# Patient Record
Sex: MALE | Race: WHITE | Employment: FULL TIME | URBAN - METROPOLITAN AREA
[De-identification: names, ages, dates, MRNs, and addresses within clinical notes are randomized per-mention and may not be internally consistent; named-entity substitution may affect disease eponyms.]

---

## 2017-05-25 ENCOUNTER — ALLSCRIPTS OFFICE VISIT (OUTPATIENT)
Dept: OTHER | Facility: OTHER | Age: 47
End: 2017-05-25

## 2017-05-25 DIAGNOSIS — E78.5 HYPERLIPIDEMIA: ICD-10-CM

## 2017-05-25 DIAGNOSIS — R73.01 IMPAIRED FASTING GLUCOSE: ICD-10-CM

## 2017-05-25 DIAGNOSIS — E87.6 HYPOKALEMIA: ICD-10-CM

## 2017-05-25 DIAGNOSIS — F40.10 SOCIAL PHOBIA: ICD-10-CM

## 2017-05-25 DIAGNOSIS — I10 ESSENTIAL (PRIMARY) HYPERTENSION: ICD-10-CM

## 2017-05-31 ENCOUNTER — ALLSCRIPTS OFFICE VISIT (OUTPATIENT)
Dept: OTHER | Facility: OTHER | Age: 47
End: 2017-05-31

## 2017-08-04 ENCOUNTER — ALLSCRIPTS OFFICE VISIT (OUTPATIENT)
Dept: OTHER | Facility: OTHER | Age: 47
End: 2017-08-04

## 2017-08-10 ENCOUNTER — GENERIC CONVERSION - ENCOUNTER (OUTPATIENT)
Dept: OTHER | Facility: OTHER | Age: 47
End: 2017-08-10

## 2017-08-31 ENCOUNTER — ALLSCRIPTS OFFICE VISIT (OUTPATIENT)
Dept: OTHER | Facility: OTHER | Age: 47
End: 2017-08-31

## 2017-09-05 ENCOUNTER — ALLSCRIPTS OFFICE VISIT (OUTPATIENT)
Dept: OTHER | Facility: OTHER | Age: 47
End: 2017-09-05

## 2017-10-04 ENCOUNTER — ALLSCRIPTS OFFICE VISIT (OUTPATIENT)
Dept: OTHER | Facility: OTHER | Age: 47
End: 2017-10-04

## 2017-10-20 ENCOUNTER — ALLSCRIPTS OFFICE VISIT (OUTPATIENT)
Dept: OTHER | Facility: OTHER | Age: 47
End: 2017-10-20

## 2017-10-30 ENCOUNTER — ALLSCRIPTS OFFICE VISIT (OUTPATIENT)
Dept: OTHER | Facility: OTHER | Age: 47
End: 2017-10-30

## 2017-11-14 ENCOUNTER — ALLSCRIPTS OFFICE VISIT (OUTPATIENT)
Dept: OTHER | Facility: OTHER | Age: 47
End: 2017-11-14

## 2017-11-28 ENCOUNTER — GENERIC CONVERSION - ENCOUNTER (OUTPATIENT)
Dept: OTHER | Facility: OTHER | Age: 47
End: 2017-11-28

## 2017-11-30 ENCOUNTER — ALLSCRIPTS OFFICE VISIT (OUTPATIENT)
Dept: OTHER | Facility: OTHER | Age: 47
End: 2017-11-30

## 2018-01-02 ENCOUNTER — GENERIC CONVERSION - ENCOUNTER (OUTPATIENT)
Dept: OTHER | Facility: OTHER | Age: 48
End: 2018-01-02

## 2018-01-02 ENCOUNTER — ALLSCRIPTS OFFICE VISIT (OUTPATIENT)
Dept: OTHER | Facility: OTHER | Age: 48
End: 2018-01-02

## 2018-01-10 NOTE — PSYCH
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  Children doing well  Feels 'time' will alleviate pain of Freda's addiction; love for her has left him Denied SI     A: Orlinda Collet returned to baseline mood and affect during session  He is comfortable with his plan of removing items and is pleased with his relationship with children  Discussed transitioning to biweekly sessions    P: Continue individual therapy  Further discussion regarding removal of items and possible residual feelings related to this  Pain Scale and Suicide Risk [facility]: Current Pain Assessment: no pain   Current suicide risk is low   Behavioral Health Treatment Plan [facility]: Diagnosis and Treatment Plan explained to patient, patient relates understanding diagnosis and is agreeable to Treatment Plan  Assessment    1  MDD (major depressive disorder), recurrent severe, without psychosis (296 33) (F33 2)   2   Social anxiety disorder (300 23) (F40 10)    Signatures   Electronically signed by : Alexander Pastor LCSW; Oct 20 2017  2:17PM EST                       (Author)

## 2018-01-10 NOTE — PSYCH
Provider Comments  Provider Comments:   Left a voice mail for Harjeet to inquire about his well being as he missed his appointment today at [address] [place] [address],[address] without a cancellation        Signatures   Electronically signed by : Zaira Israel LCSW; Aug 10 2017  5:20PM EST                       (Author)

## 2018-01-10 NOTE — RESULT NOTES
Verified Results  (1) URIC ACID [id number] 09:01AM Mckayla Lopez Order Number: [id number]     Test Name Result Flag Reference   URIC ACID 8 6 mg/dL H 4 2-8 0     (1) COMPREHENSIVE METABOLIC PANEL [id number] 80:42HX Mcakyla Lopez Order Number: [id number]      National Kidney Disease Education Program recommendations are as follows:  GFR calculation is accurate only with a steady state creatinine  Chronic Kidney disease less than 60 ml/min/1 73 sq  meters  Kidney failure less than 15 ml/min/1 73 sq  meters  Test Name Result Flag Reference   GLUCOSE,RANDM 126 mg/dL     If the patient is fasting, the ADA then defines impaired fasting glucose as > 100 mg/dL and diabetes as > or equal to 123 mg/dL     SODIUM 138 mmol/L  136-145   POTASSIUM 3 0 mmol/L L 3 5-5 3   CHLORIDE 96 mmol/L L 100-108   CARBON DIOXIDE 31 mmol/L  21-32   ANION GAP (CALC) 11 mmol/L  4-13   BLOOD UREA NITROGEN 16 mg/dL  5-25   CREATININE 0 91 mg/dL  0 60-1 30   Standardized to IDMS reference method   CALCIUM 9 5 mg/dL  8 3-10 1   BILI, TOTAL 0 38 mg/dL  0 20-1 00   ALK PHOSPHATAS 94 U/L     ALT (SGPT) 32 U/L  12-78   AST(SGOT) 17 U/L  5-45   ALBUMIN 4 0 g/dL  3 5-5 0   TOTAL PROTEIN 8 0 g/dL  6 4-8 2   eGFR Non-African American      >60 0 ml/min/1 73sq m     (1) CBC/PLT/DIFF [id number] 09:01AM Mckayla John Order Number: [id number]     Order Number: [id number]     Test Name Result Flag Reference   WBC COUNT 7 05 Thousand/uL  4 31-10 16   RBC COUNT 5 33 Million/uL  3 88-5 62   HEMOGLOBIN 16 9 g/dL  12 0-17 0   HEMATOCRIT 48 0 %  36 5-49 3   MCV 90 fL  82-98   MCH 31 7 pg  26 8-34 3   MCHC 35 2 g/dL  31 4-37 4   RDW 12 7 %  11 6-15 1   MPV 10 0 fL  8 9-12 7   PLATELET COUNT 529 Thousands/uL  149-390   NEUTROPHILS RELATIVE PERCENT 61 %  43-75   LYMPHOCYTES RELATIVE PERCENT 26 %  14-44   MONOCYTES RELATIVE PERCENT 9 %  4-12   EOSINOPHILS RELATIVE PERCENT 3 %  0-6   BASOPHILS RELATIVE PERCENT 1 %  0-1   NEUTROPHILS ABSOLUTE COUNT 4 33 Thousands/µL  1 85-7 62   LYMPHOCYTES ABSOLUTE COUNT 1 80 Thousands/µL  0 60-4 47   MONOCYTES ABSOLUTE COUNT 0 60 Thousand/µL  0 17-1 22   EOSINOPHILS ABSOLUTE COUNT 0 23 Thousand/µL  0 00-0 61   BASOPHILS ABSOLUTE COUNT 0 09 Thousands/µL  0 00-0 10     * XR FOOT 3+ VIEW RIGHT [id number] 08:58AM Magnolia Tijerina Order Number: [id number]     Test Name Result Flag Reference   XR FOOT 3+ VW RIGHT (Report)     RIGHT FOOT     INDICATION: Gout  Right foot pain, attention 1st toe  COMPARISON: None     VIEWS: AP, lateral and oblique ; 3 images     FINDINGS:     There is no acute fracture or dislocation  No degenerative changes  No lytic or blastic lesions are seen  No periarticular erosions  Soft tissue swelling noted medial to the head of the 1st metatarsal but no discrete tophus formation detected  IMPRESSION:     No acute osseous abnormality       Nonspecific soft tissue swelling medial to the head of the 1st metatarsal        Workstation performed: [id number]     Signed by:   Dariusz Duenas MD   2/18/16       Signatures   Electronically signed by : YE Montenegro; Feb 19 2016  6:10AM EST                       (Author)

## 2018-01-10 NOTE — PSYCH
Progress Note  Psychotherapy Provided [facility]: Individual Psychotherapy 50 minutes provided today  Goals addressed in session:   Discussed goals 1-2 of initial plan    D: Met with Katherin Ellis individually  ROS; remains 'so so'  Session focused upon specific obstacles ; rumination of thoughts regarding x-girlfriend  Items of hers remained stored in the basement triggering memories  "Why" statements explored and the fact they may never be answered  Communication with children going very well  Denied SI     A: Katherin Ellis presented appropriate mood and constricted affect during session  He demonstrates insight into his current stressors and demonstrates progress as he is challenging his social anxiety bu having dinner with an old friend tonight  P: Continue individual therapy  Further discussion regarding above obstacles  Pain Scale and Suicide Risk [facility]: Current Pain Assessment: no pain   Current suicide risk is low   Behavioral Health Treatment Plan [facility]: Diagnosis and Treatment Plan explained to patient, patient relates understanding diagnosis and is agreeable to Treatment Plan  Results/Data  PHQ-9 Adult Depression Screening [id number] 11:47AM Atul May     Test Name Result Flag Reference   PHQ-9 Adult Depression Score 5     Over the last two weeks, how often have you been bothered by any of the following problems?   Little interest or pleasure in doing things: Several days - 1  Feeling down, depressed, or hopeless: Several days - 1  Trouble falling or staying asleep, or sleeping too much: Not at all - 0  Feeling tired or having little energy: Several days - 1  Poor appetite or over eating: Not at all - 0  Feeling bad about yourself - or that you are a failure or have let yourself or your family down: Several days - 1  Trouble concentrating on things, such as reading the newspaper or watching television: Several days - 1  Moving or speaking so slowly that other people could have noticed  Or the opposite -  being so fidgety or restless that you have been moving around a lot more than usual: Not at all - 0  Thoughts that you would be better off dead, or of hurting yourself in some way: Not at all - 0   PHQ-9 Adult Depression Screening Negative     PHQ-9 Difficulty Level Somewhat difficult     PHQ-9 Severity Mild Depression         Assessment    1  MDD (major depressive disorder), recurrent severe, without psychosis (296 33) (F33 2)   2   Social anxiety disorder (300 23) (F40 10)    Signatures   Electronically signed by : Xiomara Hall LCSW; Oct  5 2017  4:37PM EST                       (Author)

## 2018-01-11 NOTE — PSYCH
History of Present Illness    Pre-morbid level of function and History of Present Illness:  Relationship issue  I would like a couples therapy  My girlfriend Doe Ruby is a recovering addict  Dating for less than a year but knew each other for 15  Clean 7 weeks  My stress level is high due to this   9 years  I clench my jaw, decreased motivation, not all the time but it's there  Dx social anxiety disorder and has overcome this  Reason for evaluation and partial hospitalization as an alternative to inpatient hospitalization: N/A  Current Psychiatrist/Therapist: Dr Kathy Rodriguez  Family Constellation (include parents, relationship with each and pertinent Psych/Medical History): Mother: passed away 9 years ago from lupus   Father: good relationship   Children: daughter,23 twin boys, 12 in Sept   He lives with with Doe Ruby and boys periodically  He does not live alone  Domestic Violence: No past history of domestic violence  The patient is not currently experiencing domestic violence  There is not suspected domestic violence  There is no history of child abuse  There is no history of sexual abuse  Additional Comments related to family/relationships/peer support:   Shared custody of boys  They come and go  Relationship good, my daughter and I are not due to girlfriend  Mother passed while living in the home with her  School or Work History (strengths/limitations/needs: 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  His highest grade level achieved was  two years of college  LEISURE ASSESSMENT (Include past and present hobbies/interests and level of involvement (Ex: Group/Club Affiliations): Fishing, hunting, riding 4 [place]  His primary language is  [place]  Ethnic considerations are   Religions affiliations and level of involvement [employer]   Spirituality and ariana have helped him cope with difficult situations in his life  The patient learns best by  reading and demonstration  SUBSTANCE ABUSE ASSESSMENT: past substance abuse, but no current substance abuse  Substance/Route/Age/Amount/Frequency/Last Use: Smoked Marijuana HS, college  Drink beers socially  No previous detox/rehab treatment  HEALTH ASSESSMENT: He has lost 10 lbs or more in the last 6 months without trying  He has not had decreased appetite for 5 days or more  nausea  no referral to PCP needed  no referral to nutritionist needed  not referred to PCP  PCP not notified  LEGAL: No Mental Health Advance Directive or Power of  on file  He does not want an information packet about Mental Health Advance Directives  The following ratings are based on my observation of this patient over the last 1 session  Risk of Harm to Self:   Demographic risk factors include , alaskan, or native [place]  Historical Risk Factors include: students have  of suicide  Recent Specific Risk Factors include: sense of hopelessness/helplessness, feelings of guilt or self blame and diagnosis of depression  Risk of Harm to Others:   Demographic Risk Factors include: male  Access to Weapons: The patient has access to the following weapons: [address]   the following steps have been taken to ensure weapons are properly secured: Locked cabinet - keys at another location  Based on the above information, the client presents the following risk of harm to self or others: low  Notes regarding this Risk Assessment: Denied current or historic SI/HI/SIB  Review of Systems  anxiety, depression, interpersonal relationship problems, emotional problems/concerns and decreased functioning ability, but no sleep disturbances  Constitutional: No fever or chills, feels well, no tiredness, no recent weight gain or weight loss  Eyes: No complaints of eye pain, no red eyes, no discharge from eyes, no itchy eyes     ENT: no complaints of earache, no hearing loss, no nosebleeds, no nasal discharge, no sore throat, no hoarseness  Cardiovascular: No complaints of slow heart rate, no fast heart rate, no chest pain, no palpitations, no leg claudication, no lower extremity  Respiratory: No complaints of shortness of breath, no wheezing, no cough, no SOB on exertion, no orthopnea or PND  Gastrointestinal: No complaints of abdominal pain, no constipation, no nausea or vomiting, no diarrhea or bloody stools  Genitourinary: No complaints of dysuria, no incontinence, no hesitancy, no nocturia, no genital lesion, no testicular pain  Integumentary: No complaints of skin rash or skin lesions, no itching, no skin wound, no dry skin  Neurological: No compliants of headache, no confusion, no convulsions, no numbness or tingling, no dizziness or fainting, no limb weakness, no difficulty walking  Endocrine: No complaints of proptosis, no hot flashes, no muscle weakness, no erectile dysfunction, no deepening of the voice, no feelings of weakness  Hematologic/Lymphatic: No complaints of swollen glands, no swollen glands in the neck, does not bleed easily, no easy bruising  ROS reviewed  Active Problems    1  Benign essential hypertension (401 1) (I10)   2  Hyperlipidemia (272 4) (E78 5)   3  Hypokalemia (276 8) (E87 6)   4  IFG (impaired fasting glucose) (790 21) (R73 01)   5  Long term use of drug (V58 69) (Z79 899)   6  MDD (major depressive disorder), recurrent severe, without psychosis (296 33) (F33 2)   7  Social anxiety disorder (300 23) (F40 10)    Past Medical History    1  History of Blood pressure elevated (401 9) (I10)   2  History of gout (V12 29) (Z87 39)    The active problems and past medical history were reviewed and updated today  Past Psychiatric History    Past Psychiatric History: Brandie Robins - 2007 -2008  OP: Beginning of college  Surgical History    The surgical history was reviewed and updated today  Family Psych History  Father    1   Family history of Aneurysm Of Abdominal Aorta   2  Family history of Hypertension (V17 49)  Brother    3  Family history of Hypertension (V17 49)  Paternal Grandmother    3  Family history of Hypertension (V17 49)  Paternal Grandfather    11  Family history of Hypertension (V17 49)   6  Family history of Sudden / Instantaneous Death  Paternal Uncle    9  Family history of Aneurysm Of Abdominal Aorta   8  Family history of Aneurysm Of Abdominal Aorta    The family history was reviewed and updated today  Substance Abuse Hx    Substance Abuse History: Smoked Marijuana HS, college  Drink beers socially  Social History    · Consumes alcohol occasionally (V49 89) (Z78 9)   · Current every day smoker (305 1) (F17 200)   · Denied: History of Drug Use   · Three children  The social history was reviewed and updated today  The social history was reviewed and is unchanged  Current Meds   1  ALPRAZolam 0 5 MG Oral Tablet (Xanax); TAKE 1 TABLET Every 8 hours PRN anxiety; Therapy: 19Apr2013 to (Evaluate:59Oty9598); Last Rx:37Rwm2179 Ordered   2  Valsartan 80 MG Oral Tablet; Take 1 tablet daily; Therapy: 55BQV8446 to (Royer Andujar)  Requested for: 11OGJ7157; Last   Rx:11Ivy7459 Ordered   3  Venlafaxine HCl ER 75 MG Oral Capsule Extended Release 24 Hour (Effexor XR); TAKE 1   CAPSULE Daily (Pt  states on Effexor); Therapy: 64ABH5369 to (Evaluate:29Lor0790)  Requested for: 51OFD5725; Last   Rx:79Wzz7271 Ordered   4  Vitamin C 500 MG Oral Tablet; TAKE 1 TABLET DAILY; Therapy: (Recorded:43Hom7537) to Recorded   5  Vitamin D 400 UNIT TABS; TAKE 1 TABLET DAILY; Therapy: (Recorded:17Nov2014) to Recorded    Allergies    1  chlorthalidone    2  No Known Environmental Allergies   3  No Known Food Allergies    Physical Exam    Appearance: was calm and cooperative and good eye contact  Observed mood: depressed, but mood appropriate  Observed mood: affect was blunted  Speech: a normal rate  Thought processes: coherent/organized  Hallucinations: no hallucinations present  Thought Content: no delusions  Abnormal Thoughts: The patient has no suicidal thoughts  Orientation: The patient is oriented to person, place and time  Recent and Remote Memory: short term memory intact and long term memory intact  Attention Span And Concentration: concentration intact  Insight: Insight intact  Judgment: His judgment was intact  Muscle Strength And Tone  Muscle strength and tone were normal    Language: no difficulty naming common objects   WNL  Fund of knowledge: Patient displays  WNL  The patient is experiencing no localized pain  On a scale of 0 - 10 the pain severity is a 0  DSM    Provisional Diagnosis: MDD recurrent; severe  Future Appointments    Date/Time Provider Specialty Site   08/31/2017 03:40 PM ANDRES Germain   Psychiat[facility]   11/28/2017 04:30 PM Maryalice Goltz, [address] [place] [address] Internal Medicine [facility]     Signatures   Electronically signed by : Scottie Castillo LCSW; Aug  4 2017  9:37AM EST                       (Author)    Electronically signed by : ANDRES Ceja ; Aug  4 2017 11:51AM EST                       (Author)

## 2018-01-11 NOTE — PSYCH
Psych Med Mgmt    Appearance: was calm and cooperative, adequate hygiene and grooming and good eye contact  Observed mood: mood appropriate  Observed mood: affect appropriate  Speech: a normal rate and fluent  Thought processes: coherent/organized  Hallucinations: no hallucinations present  Thought Content: no delusions  Abnormal Thoughts: The patient has no suicidal thoughts and no homicidal thoughts  Orientation: The patient is oriented to person, place and time, oriented to person, oriented to place and oriented to time  Recent and Remote Memory: short term memory intact and long term memory intact  Attention Span And Concentration: concentration impaired  Insight: Limited insight  Judgment: His judgment was limited  Muscle Strength And Tone  Muscle strength and tone were normal    The patient is experiencing no localized pain  Goals addressed in session: Medication Management       Treatment Recommendations: Continue current medications  Risks, Benefits And Possible Side Effects Of Medications: Risks, benefits, and possible side effects of medications explained to patient and patient verbalizes understanding  He reports normal appetite, normal energy level, no weight change and normal number of sleep hours  Mood has been depressed, he stated that after 3 years in a relationship he decided to break up with her because of her ongoing alcoholism  He stated he has good support system and stated he has been feeling well  He will like to start psychotherapy  Assessment    1  Social anxiety disorder (300 23) (F40 10)   2  MDD (major depressive disorder), recurrent severe, without psychosis (296 33) (F33 2)    Plan    1  ALPRAZolam 0 5 MG Oral Tablet (Xanax); TAKE 1 TABLET Every 8 hours PRN   anxiety; Do Not Fill Before: 08WHO5188   2   Venlafaxine HCl ER 75 MG Oral Capsule Extended Release 24 Hour (Effexor   XR); TAKE 1 CAPSULE Daily (Pt  states on Effexor)    Review of Systems    Constitutional: as noted in HPI  Substance Abuse Hx    Substance Abuse History: Denies  Active Problems    1  Benign essential hypertension (401 1) (I10)   2  Hyperlipidemia (272 4) (E78 5)   3  Hypokalemia (276 8) (E87 6)   4  IFG (impaired fasting glucose) (790 21) (R73 01)   5  Long term use of drug (V58 69) (Z79 899)   6  Social anxiety disorder (300 23) (F40 10)    Past Medical History    1  History of Blood pressure elevated (401 9) (I10)   2  History of gout (V12 29) (Z87 39)    The active problems and past medical history were reviewed and updated today  Allergies    1  chlorthalidone    2  No Known Environmental Allergies   3  No Known Food Allergies    Current Meds   1  ALPRAZolam 0 5 MG Oral Tablet; TAKE 1 TABLET Every 8 hours PRN anxiety; Therapy: 80Txl8875 to (Evaluate:16Jun2017); Last Rx:53Xor7390 Ordered   2  Valsartan 80 MG Oral Tablet; Take 1 tablet daily; Therapy: 25BXM3271 to (Anabel Jovani)  Requested for: 03CFI7295; Last   Rx:26Cwp4668 Ordered   3  Venlafaxine HCl ER 75 MG Oral Capsule Extended Release 24 Hour; TAKE 1 CAPSULE   Daily (Pt  states on Effexor); Therapy: 55FJT7480 to (Evaluate:85Gyg3277)  Requested for: 65PTS9735; Last   Rx:51Xab8093 Ordered   4  Vitamin C 500 MG Oral Tablet; TAKE 1 TABLET DAILY; Therapy: (Recorded:93Puc6676) to Recorded   5  Vitamin D 400 UNIT TABS; TAKE 1 TABLET DAILY; Therapy: (Recorded:17Nov2014) to Recorded    The medication list was reviewed and updated today  Family Psych History  Father    1  Family history of Aneurysm Of Abdominal Aorta   2  Family history of Hypertension (V17 49)  Brother    3  Family history of Hypertension (V17 49)  Paternal Grandmother    3  Family history of Hypertension (V17 49)  Paternal Grandfather    11  Family history of Hypertension (V17 49)   6  Family history of Sudden / Instantaneous Death  Paternal Uncle    9  Family history of Aneurysm Of Abdominal Aorta   8   Family history of Aneurysm Of Abdominal Aorta    The family history was reviewed and updated today  Social History    · Consumes alcohol occasionally (V49 89) (Z78 9)   · Current every day smoker (305 1) (F17 200)   · Denied: History of Drug Use   · Three children  The social history was reviewed and updated today  See HPI      End of Encounter Meds    1  Valsartan 80 MG Oral Tablet; Take 1 tablet daily; Therapy: 11IIS5374 to (Cyn Sharp)  Requested for: 66LJR7016; Last   Rx:38Gwf7583 Ordered    2  Vitamin C 500 MG Oral Tablet; TAKE 1 TABLET DAILY; Therapy: (Recorded:17Nov2014) to Recorded   3  Vitamin D 400 UNIT TABS; TAKE 1 TABLET DAILY; Therapy: (Recorded:17Nov2014) to Recorded    4  ALPRAZolam 0 5 MG Oral Tablet (Xanax); TAKE 1 TABLET Every 8 hours PRN anxiety; Therapy: 68Rok6788 to (Evaluate:67Kca1671); Last Rx:38Czn7461 Ordered   5  Venlafaxine HCl ER 75 MG Oral Capsule Extended Release 24 Hour (Effexor XR); TAKE 1   CAPSULE Daily (Pt  states on Effexor);    Therapy: 04YQD3716 to (Evaluate:90Aju4277)  Requested for: 46XFU7718; Last   FX:04YGU7522 Ordered    Future Appointments    Date/Time Provider Specialty Site   11/28/2017 04:30 PM José Morgan, [address] [facility]     Signatures   Electronically signed by : ANDRES Joe ; May 31 2017  4:58PM EST                       (Author)

## 2018-01-12 NOTE — PSYCH
Psych Med Mgmt    Appearance: was calm and cooperative, adequate hygiene and grooming and good eye contact  Observed mood: mood appropriate  Observed mood: affect appropriate  Speech: a normal rate  Thought processes: coherent/organized  Hallucinations: no hallucinations present  Thought Content: no delusions  Abnormal Thoughts: The patient has no suicidal thoughts and no homicidal thoughts  Orientation: The patient is oriented to person, place and time, oriented to person, oriented to place and oriented to time  Recent and Remote Memory: short term memory intact and long term memory intact  Attention Span And Concentration: concentration intact  Insight: Limited insight  Judgment: His judgment was limited  Muscle Strength And Tone  Muscle strength and tone were normal    The patient is experiencing no localized pain  Goals addressed in session: Medication Management       Treatment Recommendations: Continue current treatment  Risks, Benefits And Possible Side Effects Of Medications: Risks, benefits, and possible side effects of medications explained to patient and patient verbalizes understanding  The patient has been filling controlled prescriptions on time as prescribed to [facility] 26 program       He reports normal appetite, normal energy level, no weight change and normal number of sleep hours  Patient reported feeling "much better" He stated he no longer needs to use Alprazolam often to manage anxiety and he has worked with counselor to start decreasing the frequency of the visits since his mood has been stable since  He denies having any medication side effects   No recent health changes or new medications  Assessment    1  MDD (major depressive disorder), recurrent severe, without psychosis (296 33) (F33 2)    Plan    1   ALPRAZolam 0 5 MG Oral Tablet (Xanax); TAKE 1 TABLET Every 6 hours PRN;   Do Not Fill Before: 51TXF2218   2  Venlafaxine HCl ER 75 MG Oral Capsule Extended Release 24 Hour (Effexor   XR); take 1 capsule by mouth daily    Review of Systems    Constitutional: No fever, no chills, feels well, no tiredness, no recent weight gain or loss  Cardiovascular: no complaints of slow or fast heart rate, no chest pain, no palpitations  Respiratory: no complaints of shortness of breath, no wheezing, no dyspnea on exertion  Gastrointestinal: no complaints of abdominal pain, no constipation, no nausea, no diarrhea, no vomiting  Genitourinary: no complaints of dysuria, no incontinence, no pelvic pain, no urinary frequency  Musculoskeletal: no complaints of arthralgia, no myalgias, no limb pain, no joint stiffness  Integumentary: no complaints of skin rash, no itching, no dry skin  Neurological: no complaints of headache, no confusion, no numbness, no dizziness  Substance Abuse Hx    Substance Abuse History: Denies  Active Problems    1  Benign essential hypertension (401 1) (I10)   2  Hyperlipidemia (272 4) (E78 5)   3  Hypokalemia (276 8) (E87 6)   4  IFG (impaired fasting glucose) (790 21) (R73 01)   5  Long term use of drug (V58 69) (Z79 899)   6  MDD (major depressive disorder), recurrent severe, without psychosis (296 33) (F33 2)    Past Medical History    1  History of Blood pressure elevated (401 9) (I10)   2  History of gout (V12 29) (Z87 39)   3  History of Social anxiety disorder (300 23) (F40 10)    The active problems and past medical history were reviewed and updated today  Allergies    1  chlorthalidone    2  No Known Environmental Allergies   3  No Known Food Allergies    Current Meds   1  ALPRAZolam 0 5 MG Oral Tablet; TAKE 1 TABLET Every 6 hours PRN; Therapy: 19Apr2013 to (Evaluate:23Hyn2846); Last Rx:97Nzt7992 Ordered   2  Valsartan 80 MG Oral Tablet; Take 1 tablet daily;    Therapy: 13ZMT0705 to (Frankie Morelos)  Requested for: 34GXQ2515; Last   Rx:93Duo7258 Ordered 3  Venlafaxine HCl ER 75 MG Oral Capsule Extended Release 24 Hour; take 1 capsule by   mouth daily; Therapy: 53MTG5372 to (Evaluate:00Uob8969)  Requested for: 21Nov2017; Last   Rx:[id number] Ordered   4  Vitamin C 500 MG Oral Tablet; TAKE 1 TABLET DAILY; Therapy: (Recorded:17Nov2014) to Recorded   5  Vitamin D 400 UNIT TABS; TAKE 1 TABLET DAILY; Therapy: (Recorded:17Nov2014) to Recorded    The medication list was reviewed and updated today  Family Psych History  Father    1  Family history of Aneurysm Of Abdominal Aorta   2  Family history of Hypertension (V17 49)  Brother    3  Family history of Hypertension (V17 49)  Paternal Grandmother    [place]  Family history of Hypertension (V17 49)  Paternal Grandfather    11  Family history of Hypertension (V17 49)   6  Family history of Sudden / Instantaneous Death  Paternal Uncle    9  Family history of Aneurysm Of Abdominal Aorta   8  Family history of Aneurysm Of Abdominal Aorta    The family history was reviewed and updated today  Social History    · Consumes alcohol occasionally (V49 89) (Z78 9)   · Current every day smoker (305 1) (F17 200)   · Denied: History of Drug Use   · Three children  The social history was reviewed and updated today  The social history was reviewed and is unchanged  End of Encounter Meds    1  Valsartan 80 MG Oral Tablet; Take 1 tablet daily; Therapy: 17OGW4512 to (Linn Camp)  Requested for: 40XOM3473; Last   Rx:49Jfc5383 Ordered    2  Vitamin C 500 MG Oral Tablet; TAKE 1 TABLET DAILY; Therapy: (Recorded:17Nov2014) to Recorded   3  Vitamin D 400 UNIT TABS; TAKE 1 TABLET DAILY; Therapy: (Recorded:17Nov2014) to Recorded    4  ALPRAZolam 0 5 MG Oral Tablet (Xanax); TAKE 1 TABLET Every 6 hours PRN; Therapy: 19Apr2013 to (Evaluate:27Hlu1713); Last Rx:84Hyp9486 Ordered   5  Venlafaxine HCl ER 75 MG Oral Capsule Extended Release 24 Hour (Effexor XR); take 1   capsule by mouth daily;    Therapy: 33OIA6146 to (Evaluate:91Cnb7228)  Requested for: 59DIF7371; Last   Rx:64Hkv0503 Ordered    Future Appointments    Date/Time Provider Specialty Site   01[facility]02[facility]2018 [facility] [place] [place] [facility]     Signatures   Electronically signed by : ANDRES Turcios ; Nov 30 2017 11:50AM EST                       (Author)

## 2018-01-13 NOTE — PSYCH
Psych Med Mgmt    Appearance: was calm and cooperative, adequate hygiene and grooming and good eye contact  Observed mood: mood appropriate  Observed mood: affect appropriate  Speech: a normal rate and fluent  Thought processes: coherent/organized  Hallucinations: no hallucinations present  Thought Content: no delusions  Abnormal Thoughts: The patient has no suicidal thoughts and no homicidal thoughts  Orientation: The patient is oriented to person, place and time, oriented to person, oriented to place and oriented to time  Recent and Remote Memory: short term memory intact and long term memory intact  Attention Span And Concentration: concentration intact  Insight: Insight intact  Judgment: His judgment was intact  Muscle Strength And Tone  Muscle strength and tone were normal    The patient is experiencing no localized pain  Goals addressed in session: Medication Management       Treatment Recommendations: Continue current medications  Risks, Benefits And Possible Side Effects Of Medications: Risks, benefits, and possible side effects of medications explained to patient and patient verbalizes understanding  He reports normal appetite, normal energy level, no weight change and normal number of sleep hours  Mood is stable  No complaints or concerns  No medication side effects  Vitals  Signs   Recorded: 57DFD2339 11:00AM   Height: 5 ft 10 in  Weight: 180 lb   BMI Calculated: 25 83  BSA Calculated: 2    Assessment    1  Severe recurrent major depression (296 33) (F33 2)   2  Anxiety (300 00) (F41 9)    Plan    1  ALPRAZolam 0 5 MG Oral Tablet (Xanax); TAKE 0 5 TABLET 3 times daily PRN;   Do Not Fill Before: 51VYU8706    2  Venlafaxine HCl ER 75 MG Oral Capsule Extended Release 24 Hour (Effexor   XR); TAKE 1 CAPSULE Daily (Pt  states on Effexor)    Review of Systems    Constitutional: as noted in HPI        Substance Abuse Hx    Substance Abuse History: Tobacco  Active Problems    1  Anxiety (300 00) (F41 9)   2  Benign essential hypertension (401 1) (I10)   3  Gout (274 9) (M10 9)   4  Hyperlipidemia (272 4) (E78 5)   5  Hypokalemia (276 8) (E87 6)   6  IFG (impaired fasting glucose) (790 21) (R73 01)   7  Long term use of drug (V58 69) (Z79 899)   8  Need for diphtheria-tetanus-pertussis (Tdap) vaccine (V06 1) (Z23)   9  Need for prophylactic vaccination and inoculation against influenza (V04 81) (Z23)   10  Obstructive sleep apnea (327 23) (G47 33)   11  Severe recurrent major depression (296 33) (F33 2)   12  Social phobia (300 23) (F40 10)    Past Medical History    1  History of Blood pressure elevated (401 9) (I10)    The active problems and past medical history were reviewed and updated today  Allergies    1  No Known Drug Allergies    2  No Known Environmental Allergies   3  No Known Food Allergies    Current Meds   1  ALPRAZolam 0 5 MG Oral Tablet; TAKE 0 5 TABLET 3 times daily PRN; Therapy: 33Glo6061 to (Evaluate:22Nov2016); Last Rx:47Ozv5087 Ordered   2  Indomethacin 50 MG Oral Capsule; TAKE 1 CAPSULE Every 8 hours PRN WITH FOOD   FOR PAIN;   Therapy: 53OPE2347 to (Evaluate:67Mdk2262)  Requested for: 52SME1377; Last   Rx:85Iyw4999 Ordered   3  Valsartan 80 MG Oral Tablet; Take 1 tablet daily; Therapy: 74XJT6088 to (Yash Salomon)  Requested for: 47Frn9286; Last   Rx:25Tsp7846 Ordered   4  Venlafaxine HCl ER 75 MG Oral Capsule Extended Release 24 Hour; TAKE 1 CAPSULE   Daily (Pt  states on Effexor); Therapy: 56HCY6001 to (Evaluate:17Nov2016)  Requested for: 00ABH7395; Last   Rx:52Oef4291 Ordered   5  Vitamin C 500 MG Oral Tablet; TAKE 1 TABLET DAILY; Therapy: (Recorded:17Nov2014) to Recorded   6  Vitamin D 400 UNIT TABS; TAKE 1 TABLET DAILY; Therapy: (Recorded:17Nov2014) to Recorded    The medication list was reviewed and updated today  Family Psych History  Father    1  Family history of Aneurysm Of Abdominal Aorta   2   Family history of Hypertension (V17 49)  Brother    3  Family history of Hypertension (V17 49)  Paternal Grandmother    3  Family history of Hypertension (V17 49)  Paternal Grandfather    11  Family history of Hypertension (V17 49)   6  Family history of Sudden / Instantaneous Death  Paternal Uncle    9  Family history of Aneurysm Of Abdominal Aorta   8  Family history of Aneurysm Of Abdominal Aorta    The family history was reviewed and updated today  Social History    · Consumes alcohol occasionally (V49 89) (Z78 9)   · Current every day smoker (305 1) (F17 200)   · Denied: History of Drug Use   · Three children  The social history was reviewed and updated today  The social history was reviewed and is unchanged  End of Encounter Meds    1  ALPRAZolam 0 5 MG Oral Tablet (Xanax); TAKE 0 5 TABLET 3 times daily PRN; Therapy: 60Ayu7626 to (Evaluate:79Pqd9284); Last Rx:06Oct2016 Ordered    2  Valsartan 80 MG Oral Tablet; Take 1 tablet daily; Therapy: 75PNE8386 to (Sonal Pastor)  Requested for: 00Syp5428; Last   Rx:69Nkn4378 Ordered    3  Vitamin C 500 MG Oral Tablet; TAKE 1 TABLET DAILY; Therapy: (Recorded:14Xrz8366) to Recorded   4  Vitamin D 400 UNIT TABS; TAKE 1 TABLET DAILY; Therapy: (Recorded:17Nov2014) to Recorded    5  Indomethacin 50 MG Oral Capsule; TAKE 1 CAPSULE Every 8 hours PRN WITH FOOD   FOR PAIN;   Therapy: 56KCL9354 to (Evaluate:40Qif2992)  Requested for: 01TLX1644; Last   Rx:88Vci8918 Ordered    6  Venlafaxine HCl ER 75 MG Oral Capsule Extended Release 24 Hour (Effexor XR); TAKE 1   CAPSULE Daily (Pt  states on Effexor);    Therapy: 97JID5708 to (([phone])  Requested for: 50QYP5909; Last   Rx:06Oct2016; Status: ACTIVE - Transmit to Pharmacy - Awaiting Verification Ordered    Signatures   Electronically signed by : ANDRES Castillo ; Oct  6 2016 11:02AM EST                       (Author)

## 2018-01-13 NOTE — PSYCH
Progress Note  Psychotherapy Provided [facility]: Individual Psychotherapy 50 minutes provided today  Goals addressed in session:   Discussed goals 1-2 of initial plan    D: Met with Eron Pineda individually  ROS; 'I am great ' Session focused upon removal of Freda's things from basement; this caused great relief for Eron Pineda and he remains motivated to move on  Work going very well, relationships with children remain positive and he has begun to see a lady whom he has known from years back  Denied SI     A: Eron Pineda presented as happy and motivated to move forward  He has begun to date again  Agreed to complete one final session in January to make sure all is going well before discharging from treatment  P: Termination session in January pending all remains well  Pain Scale and Suicide Risk [facility]: Current Pain Assessment: no pain   Current suicide risk is low   Behavioral Health Treatment Plan [facility]: Diagnosis and Treatment Plan explained to patient, patient relates understanding diagnosis and is agreeable to Treatment Plan  Assessment    1   MDD (major depressive disorder), recurrent severe, without psychosis (296 33) (F33 2)    Signatures   Electronically signed by : Raul Paulino LCSW; Nov 14 2017  1:44PM EST                       (Author)

## 2018-01-13 NOTE — MISCELLANEOUS
Provider Comments  Provider Comments: The patient did not show up for his appointment today  A message was left for him to reschedule  Signatures   Electronically signed by : Naman Perez MD; Aug 29 2016 11:20PM EST                          Electronically signed by :  Naman Perez MD; Aug 29 2016 11:20PM EST

## 2018-01-14 VITALS
WEIGHT: 172.04 LBS | TEMPERATURE: 98.1 F | BODY MASS INDEX: 24.63 KG/M2 | OXYGEN SATURATION: 96 % | HEIGHT: 70 IN | DIASTOLIC BLOOD PRESSURE: 72 MMHG | SYSTOLIC BLOOD PRESSURE: 124 MMHG | HEART RATE: 94 BPM | RESPIRATION RATE: 18 BRPM

## 2018-01-14 NOTE — PSYCH
Progress Note  Psychotherapy Provided [facility]: Individual Psychotherapy 50 minutes provided today  Goals addressed in session:   D: Met with Alanna Lan individually  ROS; remains "depressed and I can't focus on anything but my job'  Session focused upon specific obstacles ; break up with girlfriend, working on communication with children and judgements towards being unable to concentrate  Completion of initial treatment plan with these obstacles included  Denied SI     A: Alanna Lan presented with depressed mood and constricted affect during session  He demonstrates insight into his current stressors and demonstrates progress in that he is working on communication with children and has asked now x-girlfriend to leave his home  P: Continue individual therapy  Further discussion regarding above obstacles  Pain Scale and Suicide Risk [facility]: Current Pain Assessment: no pain   Current suicide risk is low   Behavioral Health Treatment Plan [facility]: Diagnosis and Treatment Plan explained to patient, patient relates understanding diagnosis and is agreeable to Treatment Plan  Results/Data  PHQ-9 Adult Depression Screening 11Izl3459 10:54AM Jake Rodriguez     Test Name Result Flag Reference   PHQ-9 Adult Depression Score 12     Over the last two weeks, how often have you been bothered by any of the following problems?   Little interest or pleasure in doing things: Several days - 1  Feeling down, depressed, or hopeless: More than half the days - 2  Trouble falling or staying asleep, or sleeping too much: Several days - 1  Feeling tired or having little energy: Several days - 1  Poor appetite or over eating: Several days - 1  Feeling bad about yourself - or that you are a failure or have let yourself or your family down: More than half the days - 2  Trouble concentrating on things, such as reading the newspaper or watching television: Nearly every day - 3  Moving or speaking so slowly that other people could have noticed  Or the opposite -  being so fidgety or restless that you have been moving around a lot more than usual: Several days - 1  Thoughts that you would be better off dead, or of hurting yourself in some way: Not at all - 0   PHQ-9 Adult Depression Screening Positive     PHQ-9 Difficulty Level Very difficult     PHQ-9 Severity Moderate Depression         Assessment    1   MDD (major depressive disorder), recurrent severe, without psychosis (296 33) (F33 2)    Signatures   Electronically signed by : Emily Delgadillo LCSW; Sep  5 2017 11:45AM EST                       (Author)

## 2018-01-14 NOTE — PSYCH
Psych Med Mgmt    Appearance: was calm and cooperative, adequate hygiene and grooming and good eye contact  Observed mood: depressed and anxious  Observed mood: affect was constricted  Speech: a normal rate and fluent  Thought processes: coherent/organized  Hallucinations: no hallucinations present  Thought Content: no delusions  Abnormal Thoughts: The patient has no suicidal thoughts and no homicidal thoughts  Orientation: The patient is oriented to person, place and time, oriented to person, oriented to place and oriented to time  Recent and Remote Memory: short term memory intact and long term memory intact  Attention Span And Concentration: concentration impaired  Insight: Limited insight  Judgment: His judgment was limited  Muscle Strength And Tone  Muscle strength and tone were normal    The patient is experiencing no localized pain  Goals addressed in session: Medication Management       Treatment Recommendations: Increase Alprazolam to 0 5 mg q6 h  Risks, Benefits And Possible Side Effects Of Medications: Risks, benefits, and possible side effects of medications explained to patient and patient verbalizes understanding  The patient has been filling controlled prescriptions on time as prescribed to [facility] 26 program       He reports normal appetite, normal energy level, no weight change and normal number of sleep hours  Mood is depressed and anxious  He stated he was able to meet with counselor but still feel the time between appointments is too long   Denies medication side effects  He requested increase in frequency for Alprazolam until he is able to see counselor frequent enough  Assessment    1  MDD (major depressive disorder), recurrent severe, without psychosis (296 33) (F33 2)    Plan    1   From  ALPRAZolam 0 5 MG Oral Tablet TAKE 1 TABLET Every 8 hours PRN   anxiety To ALPRAZolam 0 5 MG Oral Tablet (Xanax) TAKE 1 TABLET Every 6 hours PRN;   Do Not Fill Before: 10Sep2017   2  Venlafaxine HCl ER 75 MG Oral Capsule Extended Release 24 Hour (Effexor   XR); TAKE 1 CAPSULE Daily (Pt  states on Effexor)    Review of Systems    Constitutional: No fever, no chills, feels well, no tiredness, no recent weight gain or loss  Cardiovascular: no complaints of slow or fast heart rate, no chest pain, no palpitations  Respiratory: no complaints of shortness of breath, no wheezing, no dyspnea on exertion  Gastrointestinal: no complaints of abdominal pain, no constipation, no nausea, no diarrhea, no vomiting  Genitourinary: no complaints of dysuria, no incontinence, no pelvic pain, no urinary frequency  Musculoskeletal: no complaints of arthralgia, no myalgias, no limb pain, no joint stiffness  Integumentary: no complaints of skin rash, no itching, no dry skin  Neurological: no complaints of headache, no confusion, no numbness, no dizziness  Substance Abuse Hx    Substance Abuse History: Denies  Active Problems    1  Benign essential hypertension (401 1) (I10)   2  Hyperlipidemia (272 4) (E78 5)   3  Hypokalemia (276 8) (E87 6)   4  IFG (impaired fasting glucose) (790 21) (R73 01)   5  Long term use of drug (V58 69) (Z79 899)   6  MDD (major depressive disorder), recurrent severe, without psychosis (296 33) (F33 2)   7  Social anxiety disorder (300 23) (F40 10)    Past Medical History    1  History of Blood pressure elevated (401 9) (I10)   2  History of gout (V12 29) (Z87 39)    The active problems and past medical history were reviewed and updated today  Allergies    1  chlorthalidone    2  No Known Environmental Allergies   3  No Known Food Allergies    Current Meds   1  ALPRAZolam 0 5 MG Oral Tablet; TAKE 1 TABLET Every 8 hours PRN anxiety; Therapy: 19Apr2013 to (Evaluate:13Nov2017); Last Rx:53Mqy7566 Ordered   2  Valsartan 80 MG Oral Tablet; Take 1 tablet daily;    Therapy: 59MCG5002 to (Rashi Bathe)  Requested for: 75OWE0087; Last   Rx:33Wnn4737 Ordered   3  Venlafaxine HCl ER 75 MG Oral Capsule Extended Release 24 Hour; TAKE 1 CAPSULE   Daily (Pt  states on Effexor); Therapy: 16DHA5221 to (Evaluate:13Nov2017)  Requested for: 01Vyv5884; Last   Rx:63Uck1520 Ordered   4  Vitamin C 500 MG Oral Tablet; TAKE 1 TABLET DAILY; Therapy: (Recorded:17Nov2014) to Recorded   5  Vitamin D 400 UNIT TABS; TAKE 1 TABLET DAILY; Therapy: (Recorded:17Nov2014) to Recorded    The medication list was reviewed and updated today  Family Psych History  Father    1  Family history of Aneurysm Of Abdominal Aorta   2  Family history of Hypertension (V17 49)  Brother    3  Family history of Hypertension (V17 49)  Paternal Grandmother    3  Family history of Hypertension (V17 49)  Paternal Grandfather    11  Family history of Hypertension (V17 49)   6  Family history of Sudden / Instantaneous Death  Paternal Uncle    9  Family history of Aneurysm Of Abdominal Aorta   8  Family history of Aneurysm Of Abdominal Aorta    The family history was reviewed and updated today  Social History    · Consumes alcohol occasionally (V49 89) (Z78 9)   · Current every day smoker (305 1) (F17 200)   · Denied: History of Drug Use   · Three children  The social history was reviewed and updated today  The social history was reviewed and is unchanged  End of Encounter Meds    1  Valsartan 80 MG Oral Tablet; Take 1 tablet daily; Therapy: 37ARS7966 to (Rashi Bathe)  Requested for: 99FNY9340; Last   Rx:35Pls9634 Ordered    2  Vitamin C 500 MG Oral Tablet; TAKE 1 TABLET DAILY; Therapy: (Recorded:17Nov2014) to Recorded   3  Vitamin D 400 UNIT TABS; TAKE 1 TABLET DAILY; Therapy: (Recorded:17Nov2014) to Recorded    4  ALPRAZolam 0 5 MG Oral Tablet (Xanax); TAKE 1 TABLET Every 6 hours PRN; Therapy: 19Apr2013 to (Evaluate:06Mzd4569); Last Rx:73Jmo3991 Ordered   5   Venlafaxine HCl ER 75 MG Oral Capsule Extended Release 24 Hour (Effexor XR); TAKE 1   CAPSULE Daily (Pt  states on Effexor);    Therapy: 17TWS4247 to (Evaluate:50Fnq8228)  Requested for: 93Hel6530; Last   Rx:[id number] Ordered    Future Appointments    Date/Time Provider Specialty Site   09/05/2017 10:00 AM José Luis Hart LCSW Psychiatry [facility]   10/04/2017 11:00 AM José Luis aHrt [facility] Psychiatry [facility]   10/12/2017 11:00 AM José Luis Hart [facility] Psychiatry [facility]   10/20/2017 12:00 PM José Luis Hart [facility] Psychiatry [facility]   11/28/2017 04:30 PM Rick Gu[facility]     Signatures   Electronically signed by : ANDRES Villa ; Aug 31 2017  4:12PM EST                       (Author)

## 2018-01-16 NOTE — MISCELLANEOUS
Provider Comments  Provider Comments:   Pt was a NOS  LMOM to call back and make new appointment        Signatures   Electronically signed by : Luisa Stone, [address]; Dec  1 2017  9:02AM EST                       (Author)

## 2018-01-17 NOTE — PSYCH
Progress Note  Psychotherapy Provided [facility]: Individual Psychotherapy 50 minutes provided today  Goals addressed in session:   Discussed goals 1-2 of initial plan    D: Met with Janelle Haas individually  ROS; 'things are ok ' Session focused upon plan to remove Freda's items falling through due to helpers being unavailable last minute  Text message triggered by Lizz Monique due to not having items dripped off; this opened up feelings of hurt and loss again  Janelle Haas expressed he is wondering how it will feel once items are gone; relief, loss, anxiety etc  Further discussion regarding change (+ or -) being both scary and exciting  Denied SI     A: Janelle Haas presented as happy and motivated to move forward once Freda's items are removed from his home  Feelings triggered by her text message though able to see how her manipulative tendencies remain present  P: Continue individual therapy  Further discussion regarding removal of items and possible residual feelings related to this  Pain Scale and Suicide Risk [facility]: Current Pain Assessment: no pain   Current suicide risk is low   Behavioral Health Treatment Plan Cassi Voss: Diagnosis and Treatment Plan explained to patient, patient relates understanding diagnosis and is agreeable to Treatment Plan  Assessment    1  MDD (major depressive disorder), recurrent severe, without psychosis (296 33) (F33 2)   2   Social anxiety disorder (300 23) (F40 10)    Signatures   Electronically signed by : Hill Lebron LCSW; Oct 30 2017  4:43PM EST                       (Author)

## 2018-01-17 NOTE — PSYCH
Treatment Plan Tracking    #1 Treatment Plan not completed within required time limits due to: Client cancelled/ no-showed scheduled appointment            Signatures   Electronically signed by : María Elena Raya LCSW; Sep  5 2017 10:04AM EST                       (Author)

## 2018-01-18 NOTE — PSYCH
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, good teacher; good relationship with my students,      Diagnosis:   Axis I: MDD recurrent severe' Social anxiety Disorder     Area of Needs: Grief for previous relationship, self esteem, depression  Long Term Goals:   I communicate more   Target Date: 1/2/17      I have begun to focus   Target Date: 1/2/17       Target Date: 1/2/17    Short Term Objectives:   Goal 1:   1  I communicate more with my children; primarily [place]  2  I communicate and seek support from other family/friends  Target Date: 1/2/17      Goal 2:   1  I no longer ruminate about Freda  2  Lizz Monique is completely out of my house  Target Date: 1/2/17      Target Date: 1/2/17      GOAL 1: Modality: Individual 4 x per month Target Date: 1/2/17       The person(s) responsible for carrying out the plan is Janelle Durbin  GOAL 2: Modality: Individual 4 x per month Target Date: 1/2/17       The person(s) responsible for carrying out the plan is Janelle Durbin  Target Date:              The first scheduled review date is 1/2/17  The expected length of service is 120 Days  Level of functioning at initial assessment: 80  The highest level of functioning in the past year was Unknown  The current level of functioning is 80               CLIENT COMMENTS / Please share your thoughts, feelings, need and/or experiences regarding your treatment plan: _____________________________________________________________________________________________________________________________________________________________________________________________________________________________________________________________________________________________________________________ Date/Time: ______________     Patient Signature: _________________________________ Date/Time: ______________ Electronically signed by : Beata De La Rosa LCSW; Sep  5 2017 10:45AM EST                       (Author)

## 2018-01-23 NOTE — PSYCH
Progress Note  Psychotherapy Provided [facility]: Individual Psychotherapy 50 minutes provided today  Goals addressed in session:   Discussed goals 1-2 of initial plan    D: Met with Leela Hess individually  ROS; 'Everything is great' Session focused upon this being a termination session with reassessment of treatment plan goals  Both goals were completed  Denied SI     A: Leela Hess presented as happy and motivated to move forward  Relationship with children are very strong and work is going well  P: Leela Hess is discharged  He is encouraged to return to therapy if/when a need arises  He agreed  Pain Scale and Suicide Risk [facility]: Current Pain Assessment: no pain   Current suicide risk is low   Behavioral Health Treatment Plan [facility]: Diagnosis and Treatment Plan explained to patient, patient relates understanding diagnosis and is agreeable to Treatment Plan  Assessment    1   MDD (major depressive disorder), recurrent severe, without psychosis (296 33) (F33 2)    Signatures   Electronically signed by : Emma Joyner LCSW; Jan 2 2018 11:45AM EST                       (Author)    Electronically signed by : Emma Joyner LCSW; Jan 2 2018 12:03PM EST                       (Author)

## 2018-01-23 NOTE — PSYCH
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, good teacher; good relationship with my students,      Diagnosis:   Axis I: MDD recurrent severe' Social anxiety Disorder     Area of Needs: Grief for previous relationship, self esteem, depression  Long Term Goals:   I communicate more   Target Date: 1/2/18   Completion Date: 1/2/18     I have begun to focus   Target Date: 1/2/18   Completion Date: 1/2/18      Target Date: 1/2/18   Completion Date: 1/2/18   Short Term Objectives:   Goal 1:   1  I communicate more with my children; primarily [place]  2  I communicate and seek support from other family/friends  Target Date: 1/2/18   Completion Date: 1/2/18     Goal 2:   1  I no longer ruminate about Freda  2  Emigdio Wagoner is completely out of my house  Target Date: 1/2/18   Completion Date: 1/2/18     Target Date: 1/2/18   Completion Date: 1/2/18     GOAL 1: Modality: Individual 4 x per month Target Date: 1/2/18 Completion Date: 1/2/18       The person(s) responsible for carrying out the plan is Heron Barillas  GOAL 2: Modality: Individual 4 x per month Target Date: 1/2/18 Completion Date: 1/2/18       The person(s) responsible for carrying out the plan is Heron Barillas  The first scheduled review date is 1/2/17  The expected length of service is 120 Days  Level of functioning at initial assessment: 80  The highest level of functioning in the past year was 95  The current level of functioning is 95               CLIENT COMMENTS / Please share your thoughts, feelings, need and/or experiences regarding your treatment plan: _____________________________________________________________________________________________________________________________________________________________________________________________________________________________________________________________________________________________________________________ Date/Time: ______________     Patient Signature: _________________________________ Date/Time: ______________       Electronically signed by : Merle Almendarez, LCSW; Jan 2 2018 10:49AM EST                       (Author)

## 2018-03-06 ENCOUNTER — TELEPHONE (OUTPATIENT)
Dept: BEHAVIORAL/MENTAL HEALTH CLINIC | Facility: CLINIC | Age: 48
End: 2018-03-06

## 2018-03-06 DIAGNOSIS — F41.1 GAD (GENERALIZED ANXIETY DISORDER): Primary | ICD-10-CM

## 2018-03-06 RX ORDER — ALPRAZOLAM 0.5 MG/1
1 TABLET ORAL EVERY 6 HOURS PRN
COMMUNITY
Start: 2013-04-19 | End: 2018-03-06 | Stop reason: SDUPTHER

## 2018-03-06 RX ORDER — ALPRAZOLAM 0.5 MG/1
0.5 TABLET ORAL EVERY 6 HOURS PRN
Qty: 120 TABLET | Refills: 2 | Status: SHIPPED | OUTPATIENT
Start: 2018-03-06 | End: 2018-09-19 | Stop reason: SDUPTHER

## 2018-03-07 NOTE — PSYCH
History of Present Illness    Discharge Summary: Admission Date: 8/4/17 Discharge Date: 1/2/18  This was a routine discharge  Discharge Diagnosis:    Axis I: MDD recurrent; severe without psychosis  Treating Physician: Dr Yanci Michael    Prognosis at time of discharge is excellent  Presenting Problem/Pertinent findings:  Anxiety, depression, history of social anxiety, relationship obstacles, family communication  Course of treatment includes  individual counseling  Treatment Progress: Maurizio Dalal attended individual therapy to address psychosocial stressors; relationship with girlfriend regarding remaining with her despite relapse with drugs, communication with children impacted due to current relationship and increase in depression and anxiety symptoms  Maurizio Dalal was able to dissolve the relationship, spoke with children and their relationship is stronger than ever and he has demonstrated a decrease in depressive symptoms  The consumer achieved all of their goals  Criteria for Discharge: The patient has  completed treatment goals and objectives and is no longer in need of services  Aftercare recommendations include:  Maurizio Dalal is encouraged to return to individual therapy if/when the need arises  Continue medication management with Dr Yanci Michael  Active Problems    1  Benign essential hypertension (401 1) (I10)   2  Hyperlipidemia (272 4) (E78 5)   3  Hypokalemia (276 8) (E87 6)   4  IFG (impaired fasting glucose) (790 21) (R73 01)   5  Long term use of drug (V58 69) (Z79 899)   6  MDD (major depressive disorder), recurrent severe, without psychosis (296 33) (F33 2)    Past Medical History    1  History of Blood pressure elevated (401 9) (I10)   2  History of gout (V12 29) (Z87 39)   3   History of Social anxiety disorder (300 23) (F40 10)    Social History    · Consumes alcohol occasionally (V49 89) (Z78 9)   · Current every day smoker (305 1) (F17 200)   · Denied: History of Drug Use   · Three children    Allergies    1  chlorthalidone    2  No Known Environmental Allergies   3  No Known Food Allergies    Current Meds   1  ALPRAZolam 0 5 MG Oral Tablet (Xanax); TAKE 1 TABLET Every 6 hours PRN; Therapy: 44Jlu4251 to (Evaluate:08Sje8859); Last Rx:38Lxa3019 Ordered   2  Valsartan 80 MG Oral Tablet; Take 1 tablet daily; Therapy: 99FVX0854 to (Poornima Bradley)  Requested for: 52EAC4292; Last   Rx:77Kug1459 Ordered   3  Venlafaxine HCl ER 75 MG Oral Capsule Extended Release 24 Hour (Effexor XR); take 1   capsule by mouth daily; Therapy: 57ITL2897 to (Evaluate:74Tdr4618)  Requested for: 95QAW0992; Last   Rx:[id number] Ordered   4  Vitamin C 500 MG Oral Tablet; TAKE 1 TABLET DAILY; Therapy: (Recorded:17Nov2014) to Recorded   5  Vitamin D 400 UNIT TABS; TAKE 1 TABLET DAILY;    Therapy: (Recorded:17Nov2014) to Recorded    Signatures   Electronically signed by : Beata De La Rosa LCSW; Jan 2 2018 11:51AM EST                       (Author)    Electronically signed by : ANDRES Villa ; Sunil  3 2018  9:31AM EST                       (Author)

## 2018-03-09 ENCOUNTER — OFFICE VISIT (OUTPATIENT)
Dept: INTERNAL MEDICINE CLINIC | Facility: CLINIC | Age: 48
End: 2018-03-09
Payer: COMMERCIAL

## 2018-03-09 VITALS
HEIGHT: 70 IN | DIASTOLIC BLOOD PRESSURE: 72 MMHG | BODY MASS INDEX: 24.97 KG/M2 | HEART RATE: 91 BPM | SYSTOLIC BLOOD PRESSURE: 132 MMHG | WEIGHT: 174.4 LBS | OXYGEN SATURATION: 96 % | RESPIRATION RATE: 16 BRPM

## 2018-03-09 DIAGNOSIS — Z00.00 HEALTHCARE MAINTENANCE: ICD-10-CM

## 2018-03-09 DIAGNOSIS — I10 ESSENTIAL HYPERTENSION: ICD-10-CM

## 2018-03-09 DIAGNOSIS — Z12.11 SCREENING FOR MALIGNANT NEOPLASM OF COLON: Primary | ICD-10-CM

## 2018-03-09 DIAGNOSIS — Z13.6 SCREENING FOR AAA (ABDOMINAL AORTIC ANEURYSM): ICD-10-CM

## 2018-03-09 PROCEDURE — 99396 PREV VISIT EST AGE 40-64: CPT | Performed by: NURSE PRACTITIONER

## 2018-03-09 RX ORDER — VENLAFAXINE HYDROCHLORIDE 75 MG/1
1 CAPSULE, EXTENDED RELEASE ORAL DAILY
COMMUNITY
Start: 2013-01-22 | End: 2018-05-20 | Stop reason: SDUPTHER

## 2018-03-09 RX ORDER — VALSARTAN 80 MG/1
1 TABLET ORAL DAILY
COMMUNITY
Start: 2016-03-01 | End: 2018-03-09 | Stop reason: SDUPTHER

## 2018-03-09 RX ORDER — VALSARTAN 80 MG/1
80 TABLET ORAL DAILY
Qty: 90 TABLET | Refills: 0 | Status: SHIPPED | OUTPATIENT
Start: 2018-03-09 | End: 2018-06-04 | Stop reason: SDUPTHER

## 2018-03-09 NOTE — PROGRESS NOTES
Assessment/Plan:       Diagnoses and all orders for this visit:    Screening for malignant neoplasm of colon  -     Ambulatory referral to Gastroenterology; Future    Essential hypertension  -     valsartan (DIOVAN) 80 mg tablet; Take 1 tablet (80 mg total) by mouth daily  -     Lipid panel; Future  -     CBC and differential; Future  -     Comprehensive metabolic panel; Future    Healthcare maintenance  -     Lipid panel; Future  -     CBC and differential; Future  -     Comprehensive metabolic panel; Future    Screening for AAA (abdominal aortic aneurysm)  -     US abdominal aorta screening aaa; Future    Other orders  -     Discontinue: valsartan (DIOVAN) 80 mg tablet; Take 1 tablet by mouth daily  -     venlafaxine (EFFEXOR-XR) 75 mg 24 hr capsule; Take 1 capsule by mouth daily        Refilled meds  Eat healthy and exercises  Colonoscopy referral ordered  AAA us screening    Subjective:      Patient ID: Faith Muñoz is a 46 y o  male  Patient is a well adult coming for his annual wellness visit     Needs referral for colonoscopy and bloodwork  Has family hx of AAA, needs to be screened    He eats healthy and exercises              The following portions of the patient's history were reviewed and updated as appropriate: allergies, current medications, past family history, past medical history, past social history, past surgical history and problem list     Review of Systems   Constitutional: Negative  HENT: Negative  Eyes: Negative  Respiratory: Negative  Cardiovascular: Negative  Gastrointestinal: Negative  Musculoskeletal: Negative  Neurological: Negative  Objective:      /72 (BP Location: Left arm, Patient Position: Sitting, Cuff Size: Large)   Pulse 91   Resp 16   Ht 5' 10" (1 778 m)   Wt 79 1 kg (174 lb 6 4 oz)   SpO2 96%   BMI 25 02 kg/m²          Physical Exam   Constitutional: He is oriented to person, place, and time   He appears well-developed and well-nourished  HENT:   Head: Normocephalic and atraumatic  Eyes: Conjunctivae are normal  Pupils are equal, round, and reactive to light  Neck: Normal range of motion  Neck supple  Cardiovascular: Normal rate and regular rhythm  Pulmonary/Chest: Effort normal and breath sounds normal    Abdominal: Soft  Bowel sounds are normal    Musculoskeletal: Normal range of motion  Neurological: He is alert and oriented to person, place, and time  Skin: Skin is warm and dry

## 2018-03-09 NOTE — TELEPHONE ENCOUNTER
Called script for xanax 0 5 mg every six hours as needed to [facility] pharmacy and patient called and made aware of script being called in

## 2018-03-27 ENCOUNTER — TELEPHONE (OUTPATIENT)
Dept: GASTROENTEROLOGY | Facility: MEDICAL CENTER | Age: 48
End: 2018-03-27

## 2018-04-13 ENCOUNTER — DOCUMENTATION (OUTPATIENT)
Dept: PSYCHIATRY | Facility: CLINIC | Age: 48
End: 2018-04-13

## 2018-05-20 DIAGNOSIS — F32.9 MDD (MAJOR DEPRESSIVE DISORDER): Primary | ICD-10-CM

## 2018-05-21 RX ORDER — VENLAFAXINE HYDROCHLORIDE 75 MG/1
CAPSULE, EXTENDED RELEASE ORAL DAILY
Qty: 90 CAPSULE | Refills: 0 | Status: SHIPPED | OUTPATIENT
Start: 2018-05-21 | End: 2018-08-18 | Stop reason: SDUPTHER

## 2018-06-04 DIAGNOSIS — I10 ESSENTIAL HYPERTENSION: ICD-10-CM

## 2018-06-04 RX ORDER — VALSARTAN 80 MG/1
80 TABLET ORAL DAILY
Qty: 90 TABLET | Refills: 0 | Status: SHIPPED | OUTPATIENT
Start: 2018-06-04 | End: 2018-09-06 | Stop reason: SDUPTHER

## 2018-08-18 DIAGNOSIS — F32.9 MDD (MAJOR DEPRESSIVE DISORDER): ICD-10-CM

## 2018-08-18 RX ORDER — VENLAFAXINE HYDROCHLORIDE 75 MG/1
CAPSULE, EXTENDED RELEASE ORAL DAILY
Qty: 90 CAPSULE | Refills: 0 | Status: SHIPPED | OUTPATIENT
Start: 2018-08-18 | End: 2018-11-21 | Stop reason: SDUPTHER

## 2018-09-06 DIAGNOSIS — I10 ESSENTIAL HYPERTENSION: ICD-10-CM

## 2018-09-06 RX ORDER — LOSARTAN POTASSIUM 50 MG/1
50 TABLET ORAL DAILY
Qty: 30 TABLET | Refills: 2 | Status: SHIPPED | OUTPATIENT
Start: 2018-09-06 | End: 2018-12-16 | Stop reason: SDUPTHER

## 2018-09-06 RX ORDER — VALSARTAN 80 MG/1
80 TABLET ORAL DAILY
Qty: 90 TABLET | Refills: 0 | Status: CANCELLED | OUTPATIENT
Start: 2018-09-06

## 2018-09-19 ENCOUNTER — OFFICE VISIT (OUTPATIENT)
Dept: PSYCHIATRY | Facility: CLINIC | Age: 48
End: 2018-09-19
Payer: COMMERCIAL

## 2018-09-19 DIAGNOSIS — F41.1 GAD (GENERALIZED ANXIETY DISORDER): ICD-10-CM

## 2018-09-19 DIAGNOSIS — F33.2 SEVERE EPISODE OF RECURRENT MAJOR DEPRESSIVE DISORDER, WITHOUT PSYCHOTIC FEATURES (HCC): ICD-10-CM

## 2018-09-19 PROCEDURE — 99213 OFFICE O/P EST LOW 20 MIN: CPT | Performed by: PSYCHIATRY & NEUROLOGY

## 2018-09-19 RX ORDER — ALPRAZOLAM 0.5 MG/1
0.5 TABLET ORAL 3 TIMES DAILY PRN
Qty: 90 TABLET | Refills: 1 | Status: SHIPPED | OUTPATIENT
Start: 2018-09-19 | End: 2018-12-28 | Stop reason: SDUPTHER

## 2018-09-19 NOTE — PSYCH
Subjective:Medication Management      Patient ID: Drake Mendes is a 46 y o  male  HPI ROS Appetite Changes and Sleep: normal appetite, normal energy level, no weight change and normal number of sleep hours   Patient is reporting stable  Mood and less anxiety  He will like to reduce Alprazolam to tid prn  No recent health changes or new medications  Review Of Systems:     Mood Normal   Behavior Normal    Thought Content Normal   General Normal    Personality Normal   Other Psych Symptoms Normal   Constitutional Negative   ENT Negative   Cardiovascular Negative   Respiratory Negative   Gastrointestinal Negative   Genitourinary Negative   Musculoskeletal Negative   Integumentary Negative   Neurological Negative   Endocrine Normal    Other Symptoms Normal              Laboratory Results: No results found for this or any previous visit  Substance Abuse History:  History   Drug Use No       Family Psychiatric History:   Family History   Problem Relation Age of Onset    Aneurysm Father         ABDOMINAL AORTA    Hypertension Father     Hypertension Brother     Hypertension Paternal Grandmother     Hypertension Paternal Grandfather     Other Paternal Grandfather         SUDDEN DEATH     Aneurysm Paternal Uncle         ABDOMINAL AORTA       The following portions of the patient's history were reviewed and updated as appropriate: allergies, current medications, past family history, past medical history, past social history, past surgical history and problem list     Social History     Social History    Marital status: /Civil Union     Spouse name: N/A    Number of children: 3    Years of education: N/A     Occupational History    Not on file       Social History Main Topics    Smoking status: Current Every Day Smoker     Packs/day: 1 00     Years: 30 00    Smokeless tobacco: Never Used      Comment: 0 5-1 PPD  STARTED AT 14 YRS OLD     Alcohol use Yes      Comment: OCCASIONALLY -- COUPLE DRINKS PER WEEK     Drug use: No    Sexual activity: Not on file     Other Topics Concern    Not on file     Social History Narrative    [address]          Social History     Social History Narrative    [address] [facility] -- [place] 2001 -- [place] 2003           Objective:       Mental status:  Appearance calm and cooperative , adequate hygiene and grooming and poor eye contact    Mood euthymic   Affect affect was constricted   Speech a normal rate and fluent   Thought Processes coherent/organized and normal thought processes   Hallucinations no hallucinations present    Thought Content no delusions   Abnormal Thoughts no suicidal thoughts  and no homicidal thoughts    Orientation  oriented to person and place and time   Remote Memory short term memory intact and long term memory intact   Attention Span concentration intact   Intellect Appears to be of Average Intelligence   Insight Limited insight   Judgement judgment was limited   Muscle Strength Muscle strength and tone were normal and Normal gait    Language no difficulty naming common objects, no difficulty repeating a phrase  and no difficulty writing a sentence    Fund of Knowledge displays adequate knowledge of current events, adequate fund of knowledge regarding past history and adequate fund of knowledge regarding vocabulary    Pain none   Pain Scale 0       Assessment/Plan:       Diagnoses and all orders for this visit:    Severe episode of recurrent major depressive disorder, without psychotic features (HCC)    SUNDEEP (generalized anxiety disorder)  -     ALPRAZolam (XANAX) 0 5 mg tablet;  Take 1 tablet (0 5 mg total) by mouth 3 (three) times a day as needed for anxiety            Treatment Recommendations- Risks Benefits      Immediate Medical/Psychiatric/Psychotherapy Treatments and Any Precautions: taper Alprazolam to 0 5 mg tid prn    Risks, Benefits And Possible Side Effects Of Medications: {PSYCH RISK, BENEFITS AND POSSIBLE SIDE EFFECTS (Optional):33136    Controlled Medication Discussion: Discussed with patient Black Box warning on concurrent use of benzodiazepines and opioid medications including sedation, respiratory depression, coma and death  Patient understands the risk of treatment with benzodiazepines in addition to opioids and wants to continue taking those medications  , Discussed with patient the risks of sedation, respiratory depression, impairment of ability to drive and potential for abuse and addiction related to treatment with benzodiazepine medications  The patient understands risk of treatment with benzodiazepine medications, agrees to not drive if feels impaired and agrees to take medications as prescribed   and The patient has been filling controlled prescriptions on time as prescribed to [place] [address] program

## 2018-11-09 ENCOUNTER — DOCUMENTATION (OUTPATIENT)
Dept: PSYCHIATRY | Facility: CLINIC | Age: 48
End: 2018-11-09

## 2018-11-09 NOTE — PROGRESS NOTES
# 1Treatment Plan not completed within required time limits due to : Provider will be out of the office 12/18/18 and will reschedule at a later time

## 2018-11-21 DIAGNOSIS — F32.9 MDD (MAJOR DEPRESSIVE DISORDER): ICD-10-CM

## 2018-11-21 NOTE — TELEPHONE ENCOUNTER
I spoke with Dmitry Higuera  Renewal can wait until Dr Maddi Dennis' RTO, he has enough medication until then  Appointment scheduled was bumped and will ask front staff to call to reschedule

## 2018-11-24 RX ORDER — VENLAFAXINE HYDROCHLORIDE 75 MG/1
CAPSULE, EXTENDED RELEASE ORAL DAILY
Qty: 90 CAPSULE | Refills: 0 | Status: SHIPPED | OUTPATIENT
Start: 2018-11-24 | End: 2019-02-21 | Stop reason: SDUPTHER

## 2018-12-16 DIAGNOSIS — I10 ESSENTIAL HYPERTENSION: ICD-10-CM

## 2018-12-17 RX ORDER — LOSARTAN POTASSIUM 50 MG/1
TABLET ORAL
Qty: 30 TABLET | Refills: 2 | Status: SHIPPED | OUTPATIENT
Start: 2018-12-17 | End: 2019-02-04 | Stop reason: SDUPTHER

## 2018-12-27 ENCOUNTER — TELEPHONE (OUTPATIENT)
Dept: PSYCHIATRY | Facility: CLINIC | Age: 48
End: 2018-12-27

## 2018-12-27 DIAGNOSIS — F41.1 GAD (GENERALIZED ANXIETY DISORDER): Primary | ICD-10-CM

## 2018-12-28 RX ORDER — ALPRAZOLAM 0.5 MG/1
0.5 TABLET ORAL 3 TIMES DAILY PRN
Qty: 90 TABLET | Refills: 0 | Status: SHIPPED | OUTPATIENT
Start: 2018-12-28 | End: 2019-02-18 | Stop reason: SDUPTHER

## 2019-01-17 ENCOUNTER — DOCUMENTATION (OUTPATIENT)
Dept: PSYCHIATRY | Facility: CLINIC | Age: 49
End: 2019-01-17

## 2019-01-17 NOTE — PROGRESS NOTES
Treatment Plan not completed within required time limits due to: cancelled appointment  on 1/17/2019

## 2019-02-04 DIAGNOSIS — I10 ESSENTIAL HYPERTENSION: ICD-10-CM

## 2019-02-04 RX ORDER — LOSARTAN POTASSIUM 50 MG/1
50 TABLET ORAL DAILY
Qty: 90 TABLET | Refills: 3 | Status: SHIPPED | OUTPATIENT
Start: 2019-02-04 | End: 2019-11-26 | Stop reason: SDUPTHER

## 2019-02-18 DIAGNOSIS — F41.1 GAD (GENERALIZED ANXIETY DISORDER): ICD-10-CM

## 2019-02-18 RX ORDER — ALPRAZOLAM 0.5 MG/1
0.5 TABLET ORAL 3 TIMES DAILY PRN
Qty: 90 TABLET | Refills: 0 | Status: SHIPPED | OUTPATIENT
Start: 2019-02-18 | End: 2019-03-19 | Stop reason: SDUPTHER

## 2019-02-18 NOTE — TELEPHONE ENCOUNTER
Trey De Jesus,  Please see request for refill from [facility] for Xanax in Dr Christiano Moralez absence  Thank you

## 2019-02-21 DIAGNOSIS — F32.9 MDD (MAJOR DEPRESSIVE DISORDER): ICD-10-CM

## 2019-02-21 RX ORDER — VENLAFAXINE HYDROCHLORIDE 75 MG/1
75 CAPSULE, EXTENDED RELEASE ORAL DAILY
Qty: 90 CAPSULE | Refills: 0 | Status: SHIPPED | OUTPATIENT
Start: 2019-02-21 | End: 2019-03-19 | Stop reason: SDUPTHER

## 2019-03-11 ENCOUNTER — OFFICE VISIT (OUTPATIENT)
Dept: INTERNAL MEDICINE CLINIC | Facility: CLINIC | Age: 49
End: 2019-03-11
Payer: COMMERCIAL

## 2019-03-11 VITALS
BODY MASS INDEX: 26.94 KG/M2 | TEMPERATURE: 99.5 F | WEIGHT: 188.2 LBS | OXYGEN SATURATION: 96 % | SYSTOLIC BLOOD PRESSURE: 122 MMHG | DIASTOLIC BLOOD PRESSURE: 90 MMHG | HEIGHT: 70 IN | HEART RATE: 97 BPM

## 2019-03-11 DIAGNOSIS — Z13.6 SCREENING FOR CARDIOVASCULAR CONDITION: ICD-10-CM

## 2019-03-11 DIAGNOSIS — Z12.5 SCREENING PSA (PROSTATE SPECIFIC ANTIGEN): ICD-10-CM

## 2019-03-11 DIAGNOSIS — Z20.2 STD EXPOSURE: ICD-10-CM

## 2019-03-11 DIAGNOSIS — R06.00 DOE (DYSPNEA ON EXERTION): Primary | ICD-10-CM

## 2019-03-11 DIAGNOSIS — M10.9 GOUT, UNSPECIFIED CAUSE, UNSPECIFIED CHRONICITY, UNSPECIFIED SITE: ICD-10-CM

## 2019-03-11 DIAGNOSIS — Z12.11 SCREENING FOR MALIGNANT NEOPLASM OF COLON: ICD-10-CM

## 2019-03-11 DIAGNOSIS — I10 BENIGN ESSENTIAL HYPERTENSION: ICD-10-CM

## 2019-03-11 DIAGNOSIS — Z82.49 FAMILY HISTORY OF ABDOMINAL AORTIC ANEURYSM (AAA): ICD-10-CM

## 2019-03-11 DIAGNOSIS — Z72.0 TOBACCO ABUSE: ICD-10-CM

## 2019-03-11 DIAGNOSIS — Z00.00 WELLNESS EXAMINATION: ICD-10-CM

## 2019-03-11 PROBLEM — R06.09 DOE (DYSPNEA ON EXERTION): Status: ACTIVE | Noted: 2019-03-11

## 2019-03-11 PROCEDURE — 99214 OFFICE O/P EST MOD 30 MIN: CPT | Performed by: INTERNAL MEDICINE

## 2019-03-11 PROCEDURE — 99396 PREV VISIT EST AGE 40-64: CPT | Performed by: INTERNAL MEDICINE

## 2019-03-11 PROCEDURE — 3008F BODY MASS INDEX DOCD: CPT | Performed by: INTERNAL MEDICINE

## 2019-03-11 NOTE — ASSESSMENT & PLAN NOTE
Chronic intermittent mild suspect COPD will check PFT I have counseled patient quit smoking, I have discussed the lung cancer screening CT scan he would like to hold off at this point because his age does not meet the criteria he will consider and let me know in the future if interested

## 2019-03-11 NOTE — PROGRESS NOTES
NAME: Amalia Lala  AGE: 44 y o  SEX: male  : 1970     DATE: 3/11/2019    Assessment and Plan     Problem List Items Addressed This Visit        Other    Wellness examination     Assessment and plan 1  Health maintenance annual wellness examination overall the patient is clinically stable and doing well, we encouraged the patient to follow a healthy and balanced diet  We recommend that the patient exercise routinely approximately 30 minutes 5 times per week   We have reviewed the patient's vaccines and have made recommendations for updates if necessary  consider the new shingles vaccine      We will be ordering screening laboratories which are age appropriate  Return to the office in      call if any problems             Other Visit Diagnoses     KESSLER (dyspnea on exertion)    -  Primary    Family history of abdominal aortic aneurysm (AAA)        Relevant Orders    US abdominal aorta screening aaa    Screening PSA (prostate specific antigen)        Relevant Orders    PSA, Total Screen    Screening for malignant neoplasm of colon        Relevant Orders    Ambulatory referral to Gastroenterology    Screening for cardiovascular condition        Relevant Orders    Lipid Panel with Direct LDL reflex    Comprehensive metabolic panel    STD exposure        Relevant Orders    HIV 1/2 AG-AB combo    RPR    Hepatitis panel, acute    Chlamydia/GC amplified DNA by PCR    Tobacco abuse        Relevant Orders    Complete PFT without post bronchodilator    Gout, unspecified cause, unspecified chronicity, unspecified site        Relevant Orders    Uric acid            · Patient Counseling:   · Nutrition: Stressed importance of a well balanced diet, moderation of sodium/saturated fat, caloric balance and sufficient intake of fiber  · Exercise: Stressed the importance of regular exercise with a goal of 150 minutes per week walking daily  · Dental Health: Discussed daily flossing and brushing and regular dental visits · Immunizations reviewed y  · Discussed benefits of screening y  · Discussed the patient's BMI with him  The BMI is above average; BMI management plan is completed     No follow-ups on file     htn for a couple yrs   Father , fathers brother x4- abdominal aortic anuyrsm  Chief Complaint     Chief Complaint   Patient presents with    Annual Exam       History of Present Illness     HPI    Well Adult Physical   Patient here for a comprehensive physical exam       Diet and Physical Activity  Diet: well balanced diet  Weight concerns: None, patient's BMI is between 18 5-24 9  Exercise: intermittently      Depression Screen  PHQ-9 Depression Screening    PHQ-9:    Frequency of the following problems over the past two weeks:               General Health  Hearing: Normal:  bilateral  Vision: no vision problems  Dental: regular dental visits    Reproductive Health  no      The following portions of the patient's history were reviewed and updated as appropriate: allergies, current medications, past family history, past medical history, past social history, past surgical history and problem list     Review of Systems     Review of Systems    Past Medical History     Past Medical History:   Diagnosis Date    Elevated blood pressure reading     LAST ASSESSED [id number]    Gout     LAST ASSESSED [id number]    Hypertension     Social anxiety disorder     LAST ASSESSED [id number]       Past Surgical History     No past surgical history on file      Social History     Social History     Socioeconomic History    Marital status: /Civil Union     Spouse name: Not on file    Number of children: 3    Years of education: Not on file    Highest education level: Not on file   Occupational History    Not on file   Social Needs    Financial resource strain: Not on file    Food insecurity:     Worry: Not on file     Inability: Not on file    Transportation needs:     Medical: Not on file     Non-medical: Not on file Tobacco Use    Smoking status: Current Every Day Smoker     Packs/day: 1 00     Years: 30 00     Pack years: 30 00    Smokeless tobacco: Never Used    Tobacco comment: 0 5-1 PPD  STARTED AT 14 YRS OLD    Substance and Sexual Activity    Alcohol use: Yes     Comment: OCCASIONALLY -- COUPLE DRINKS PER WEEK     Drug use: No    Sexual activity: Not on file   Lifestyle    Physical activity:     Days per week: Not on file     Minutes per session: Not on file    Stress: Not on file   Relationships    Social connections:     Talks on phone: Not on file     Gets together: Not on file     Attends Denominational service: Not on file     Active member of club or organization: Not on file     Attends meetings of clubs or organizations: Not on file     Relationship status: Not on file    Intimate partner violence:     Fear of current or ex partner: Not on file     Emotionally abused: Not on file     Physically abused: Not on file     Forced sexual activity: Not on file   Other Topics Concern    Not on file   Social History Narrative    [facility]  - [place] 1994 -- [place] 2001 -- [address]       Family History     Family History   Problem Relation Age of Onset    Aneurysm Father         ABDOMINAL AORTA    Hypertension Father     Hypertension Brother     Hypertension Paternal Grandmother     Hypertension Paternal Grandfather     Other Paternal Grandfather         SUDDEN DEATH     Aneurysm Paternal Uncle         ABDOMINAL AORTA       Current Medications       Current Outpatient Medications:     ALPRAZolam (XANAX) 0 5 mg tablet, Take 1 tablet (0 5 mg total) by mouth 3 (three) times a day as needed for anxiety for up to 30 days, Disp: 90 tablet, Rfl: 0    losartan (COZAAR) 50 mg tablet, Take 1 tablet (50 mg total) by mouth daily, Disp: 90 tablet, Rfl: 3    venlafaxine (EFFEXOR-XR) 75 mg 24 hr capsule, Take 1 capsule (75 mg total) by mouth daily, Disp: 90 capsule, Rfl: 0     Allergies     Allergies   Allergen Reactions    Chlorthalidone      [facility] - [id number]: GOUT       Objective     /90   Pulse 97   Temp 99 5 °F (37 5 °C)   Ht 5' 10" (1 778 m)   Wt 85 4 kg (188 lb 3 2 oz)   SpO2 96%   BMI 27 00 kg/m²      Physical Exam   Constitutional: He appears well-developed and well-nourished  No distress  HENT:   Head: Normocephalic and atraumatic  Right Ear: External ear normal    Left Ear: External ear normal    Mouth/Throat: Oropharynx is clear and moist    Eyes: Pupils are equal, round, and reactive to light  Conjunctivae are normal  Right eye exhibits no discharge  Left eye exhibits no discharge  No scleral icterus  Neck: Neck supple  Cardiovascular: Normal rate, regular rhythm and normal heart sounds  Exam reveals no gallop and no friction rub  No murmur heard  Pulmonary/Chest: No respiratory distress  He has no wheezes  He has no rales  Abdominal: Soft  Bowel sounds are normal  He exhibits no distension and no mass  There is no tenderness  There is no rebound and no guarding  Musculoskeletal: He exhibits no edema or deformity  Lymphadenopathy:     He has no cervical adenopathy  Neurological: He is alert  Skin: He is not diaphoretic  Psychiatric: He has a normal mood and affect           No exam data present    Health Maintenance     Health Maintenance   Topic Date Due    CRC Screening: Colonoscopy  1970    BMI: Followup Plan  02/28/1986    Pneumococcal PPSV23 Medium Risk Adult (1 of 1 - PPSV23) 02/28/1987    INFLUENZA VACCINE  07/01/2018    BMI: Adult  03/11/2020    DTaP,Tdap,and Td Vaccines (2 - Td) 03/01/2026    HEPATITIS B VACCINES  Aged Out     Immunization History   Administered Date(s) Administered    Tdap 03/01/2016       Kay Walton DO  [facility]

## 2019-03-11 NOTE — PROGRESS NOTES
Assessment/Plan:    Screening PSA (prostate specific antigen)  Assessment and plan 1  Health maintenance annual wellness examination overall the patient is clinically stable and doing well, we encouraged the patient to follow a healthy and balanced diet  We recommend that the patient exercise routinely approximately 30 minutes 5 times per week   We have reviewed the patient's vaccines and have made recommendations for updates if necessary  consider the new shingles vaccine      We will be ordering screening laboratories which are age appropriate  Return to the office in      call if any problems  Benign essential hypertension  Hypertension - controlled, I have counseled patient following healthy balance diet, I would like the patient reduce sodium, exercise routinely, I would like the patient continued the med current medical regiment and we will continue to monitor  KESSLER (dyspnea on exertion)  Chronic intermittent mild suspect COPD will check PFT I have counseled patient quit smoking, I have discussed the lung cancer screening CT scan he would like to hold off at this point because his age does not meet the criteria he will consider and let me know in the future if interested  Family history of abdominal aortic aneurysm (AAA)  Will check ultrasound of the aorta    Gout  Currently asymptomatic will check uric acid level    STD exposure  Currently asymptomatic he reports me previous partner did have hair on addiction not IV will check STD screening    Tobacco abuse  I have counseled the patient to quit smoking, I have recommended that the patient set up a quit date and I have asked the patient to cut down the cigarettes until the quit date           Problem List Items Addressed This Visit        Cardiovascular and Mediastinum    Benign essential hypertension     Hypertension - controlled, I have counseled patient following healthy balance diet, I would like the patient reduce sodium, exercise routinely, I would like the patient continued the med current medical regiment and we will continue to monitor  Other    Screening PSA (prostate specific antigen)     Assessment and plan 1  Health maintenance annual wellness examination overall the patient is clinically stable and doing well, we encouraged the patient to follow a healthy and balanced diet  We recommend that the patient exercise routinely approximately 30 minutes 5 times per week   We have reviewed the patient's vaccines and have made recommendations for updates if necessary  consider the new shingles vaccine      We will be ordering screening laboratories which are age appropriate  Return to the office in      call if any problems  Relevant Orders    PSA, Total Screen    KESSLER (dyspnea on exertion) - Primary     Chronic intermittent mild suspect COPD will check PFT I have counseled patient quit smoking, I have discussed the lung cancer screening CT scan he would like to hold off at this point because his age does not meet the criteria he will consider and let me know in the future if interested  Family history of abdominal aortic aneurysm (AAA)     Will check ultrasound of the aorta         Relevant Orders    US abdominal aorta screening aaa    STD exposure     Currently asymptomatic he reports me previous partner did have hair on addiction not IV will check STD screening         Relevant Orders    HIV 1/2 AG-AB combo    RPR    Hepatitis panel, acute    Chlamydia/GC amplified DNA by PCR    Tobacco abuse     I have counseled the patient to quit smoking, I have recommended that the patient set up a quit date and I have asked the patient to cut down the cigarettes until the quit date           Relevant Orders    Complete PFT without post bronchodilator    Gout     Currently asymptomatic will check uric acid level         Relevant Orders    Uric acid      Other Visit Diagnoses     Screening for malignant neoplasm of colon        Relevant Orders    Ambulatory referral to Gastroenterology    Screening for cardiovascular condition        Relevant Orders    Lipid Panel with Direct LDL reflex    Comprehensive metabolic panel          Return to office  3 months  call if any problems  Subjective:      Patient ID: Chip Carlson is a 44 y o  male  HPI 52-year old male coming in for a follow up visit regarding dyspnea on exertion, family history of aortic aneurysm, STD exposure, tobacco abuse, gout; The patient reports me compliant taking medications without untoward side effects the  The patient is here to review his medical condition, update me on the medical condition and the patient reports me no hospitalizations and no ER visits  He reports me no episodes of gout  He continues to smoke he is not ready to quit  He reports me his former partner was a heroin addict but not IV drug user  He would like to have STD screening but no specific symptoms  The following portions of the patient's history were reviewed and updated as appropriate: allergies, current medications, past family history, past medical history, past social history, past surgical history and problem list     Review of Systems   Constitutional: Negative for activity change, appetite change and unexpected weight change  Eyes: Negative for visual disturbance  Respiratory: Positive for shortness of breath (Chronic dyspnea on exertion)  Negative for cough  Cardiovascular: Negative for chest pain  Gastrointestinal: Negative for abdominal pain, diarrhea, nausea and vomiting  Hematological: Negative for adenopathy  Objective:    No follow-ups on file  No results found         Allergies   Allergen Reactions    Chlorthalidone      Annotation - [id number]: GOUT       Past Medical History:   Diagnosis Date    Elevated blood pressure reading     LAST ASSESSED [id number]    Gout     LAST ASSESSED [id number]    Hypertension     Social anxiety disorder     LAST ASSESSED [id number]     No past surgical history on file  Current Outpatient Medications on File Prior to Visit   Medication Sig Dispense Refill    ALPRAZolam (XANAX) 0 5 mg tablet Take 1 tablet (0 5 mg total) by mouth 3 (three) times a day as needed for anxiety for up to 30 days 90 tablet 0    losartan (COZAAR) 50 mg tablet Take 1 tablet (50 mg total) by mouth daily 90 tablet 3    venlafaxine (EFFEXOR-XR) 75 mg 24 hr capsule Take 1 capsule (75 mg total) by mouth daily 90 capsule 0     No current facility-administered medications on file prior to visit        Family History   Problem Relation Age of Onset    Aneurysm Father         ABDOMINAL AORTA    Hypertension Father     Hypertension Brother     Hypertension Paternal Grandmother     Hypertension Paternal Grandfather     Other Paternal Grandfather         SUDDEN DEATH     Aneurysm Paternal Uncle         ABDOMINAL AORTA     Social History     Socioeconomic History    Marital status: /Civil Union     Spouse name: Not on file    Number of children: 3    Years of education: Not on file    Highest education level: Not on file   Occupational History    Not on file   Social Needs    Financial resource strain: Not on file    Food insecurity:     Worry: Not on file     Inability: Not on file    Transportation needs:     Medical: Not on file     Non-medical: Not on file   Tobacco Use    Smoking status: Current Every Day Smoker     Packs/day: 1 00     Years: 30 00     Pack years: 30 00    Smokeless tobacco: Never Used    Tobacco comment: 0 5-1 PPD  STARTED AT 14 YRS OLD    Substance and Sexual Activity    Alcohol use: Yes     Comment: OCCASIONALLY -- COUPLE DRINKS PER WEEK     Drug use: No    Sexual activity: Not on file   Lifestyle    Physical activity:     Days per week: Not on file     Minutes per session: Not on file    Stress: Not on file   Relationships    Social connections:     Talks on phone: Not on file     Gets together: Not on file Attends Jew service: Not on file     Active member of club or organization: Not on file     Attends meetings of clubs or organizations: Not on file     Relationship status: Not on file    Intimate partner violence:     Fear of current or ex partner: Not on file     Emotionally abused: Not on file     Physically abused: Not on file     Forced sexual activity: Not on file   Other Topics Concern    Not on file   Social History Narrative    [address] [facility] -- [address]:    03/11/19 1404   BP: 122/90   Pulse: 97   Temp: 99 5 °F (37 5 °C)   SpO2: 96%   Weight: 85 4 kg (188 lb 3 2 oz)   Height: 5' 10" (1 778 m)     Results for orders placed or performed in visit on 02/18/16   Uric acid   Result Value Ref Range    Uric Acid 8 6 (H) 4 2 - 8 0 mg/dL   Comprehensive metabolic panel   Result Value Ref Range    Sodium 138 136 - 145 mmol/L    Potassium 3 0 (L) 3 5 - 5 3 mmol/L    Chloride 96 (L) 100 - 108 mmol/L    CO2 31 21 - 32 mmol/L    ANION GAP 11 4 - 13 mmol/L    BUN 16 5 - 25 mg/dL    Creatinine 0 91 0 60 - 1 30 mg/dL    Glucose 126 65 - 140 mg/dL    Calcium 9 5 8 3 - 10 1 mg/dL    AST 17 5 - 45 U/L    ALT 32 12 - 78 U/L    Alkaline Phosphatase 94 46 - 116 U/L    Total Protein 8 0 6 4 - 8 2 g/dL    Albumin 4 0 3 5 - 5 0 g/dL    Total Bilirubin 0 38 0 20 - 1 00 mg/dL    eGFR >60 0 ml/min/1 73sq m   CBC and differential   Result Value Ref Range    WBC 7 05 4 31 - 10 16 Thousand/uL    RBC 5 33 3 88 - 5 62 Million/uL    Hemoglobin 16 9 12 0 - 17 0 g/dL    Hematocrit 48 0 36 5 - 49 3 %    MCV 90 82 - 98 fL    MCH 31 7 26 8 - 34 3 pg    MCHC 35 2 31 4 - 37 4 g/dL    RDW 12 7 11 6 - 15 1 %    MPV 10 0 8 9 - 12 7 fL    Platelets 067 976 - 628 Thousands/uL    Neutrophils Relative 61 43 - 75 %    Lymphocytes Relative 26 14 - 44 %    Monocytes Relative 9 4 - 12 %    Eosinophils Relative 3 0 - 6 %    Basophils Relative 1 0 - 1 %    Neutrophils Absolute 4 33 1 85 - 7 62 Thousands/µL Lymphocytes Absolute 1 80 0 60 - 4 47 Thousands/µL    Monocytes Absolute 0 60 0 17 - 1 22 Thousand/µL    Eosinophils Absolute 0 23 0 00 - 0 61 Thousand/µL    Basophils Absolute 0 09 0 00 - 0 10 Thousands/µL     Weight (last 2 days)     Date/Time   Weight    03/11/19 1404   85 4 (188 2)            Body mass index is 27 kg/m²  BP      Temp      Pulse     Resp      SpO2        Vitals:    03/11/19 1404   Weight: 85 4 kg (188 lb 3 2 oz)     Vitals:    03/11/19 1404   Weight: 85 4 kg (188 lb 3 2 oz)       /90   Pulse 97   Temp 99 5 °F (37 5 °C)   Ht 5' 10" (1 778 m)   Wt 85 4 kg (188 lb 3 2 oz)   SpO2 96%   BMI 27 00 kg/m²          Physical Exam   Constitutional: He appears well-developed and well-nourished  No distress  HENT:   Head: Normocephalic and atraumatic  Right Ear: External ear normal    Left Ear: External ear normal    Mouth/Throat: Oropharynx is clear and moist    Eyes: Pupils are equal, round, and reactive to light  Conjunctivae are normal  Right eye exhibits no discharge  Left eye exhibits no discharge  No scleral icterus  Neck: Neck supple  Cardiovascular: Normal rate, regular rhythm and normal heart sounds  Exam reveals no gallop and no friction rub  No murmur heard  Pulmonary/Chest: No respiratory distress  He has no wheezes  He has no rales  Abdominal: Soft  Bowel sounds are normal  He exhibits no distension and no mass  There is no tenderness  There is no rebound and no guarding  Musculoskeletal: He exhibits no edema or deformity  Lymphadenopathy:     He has no cervical adenopathy  Neurological: He is alert  Skin: He is not diaphoretic  Psychiatric: He has a normal mood and affect

## 2019-03-11 NOTE — ASSESSMENT & PLAN NOTE
Currently asymptomatic he reports me previous partner did have hair on addiction not IV will check STD screening

## 2019-03-11 NOTE — ASSESSMENT & PLAN NOTE
I have counseled the patient to quit smoking, I have recommended that the patient set up a quit date and I have asked the patient to cut down the cigarettes until the quit date

## 2019-03-11 NOTE — ASSESSMENT & PLAN NOTE
Assessment and plan 1  Health maintenance annual wellness examination overall the patient is clinically stable and doing well, we encouraged the patient to follow a healthy and balanced diet  We recommend that the patient exercise routinely approximately 30 minutes 5 times per week   We have reviewed the patient's vaccines and have made recommendations for updates if necessary  consider the new shingles vaccine      We will be ordering screening laboratories which are age appropriate  Return to the office in      call if any problems

## 2019-03-19 ENCOUNTER — OFFICE VISIT (OUTPATIENT)
Dept: PSYCHIATRY | Facility: CLINIC | Age: 49
End: 2019-03-19
Payer: COMMERCIAL

## 2019-03-19 DIAGNOSIS — F32.9 MDD (MAJOR DEPRESSIVE DISORDER): ICD-10-CM

## 2019-03-19 DIAGNOSIS — F41.1 GAD (GENERALIZED ANXIETY DISORDER): ICD-10-CM

## 2019-03-19 DIAGNOSIS — F33.2 MDD (MAJOR DEPRESSIVE DISORDER), RECURRENT SEVERE, WITHOUT PSYCHOSIS (HCC): Primary | ICD-10-CM

## 2019-03-19 PROCEDURE — 99213 OFFICE O/P EST LOW 20 MIN: CPT | Performed by: PSYCHIATRY & NEUROLOGY

## 2019-03-19 RX ORDER — ALPRAZOLAM 0.5 MG/1
0.5 TABLET ORAL 3 TIMES DAILY PRN
Qty: 90 TABLET | Refills: 2 | Status: SHIPPED | OUTPATIENT
Start: 2019-03-19 | End: 2019-08-06 | Stop reason: SDUPTHER

## 2019-03-19 RX ORDER — VENLAFAXINE HYDROCHLORIDE 75 MG/1
75 CAPSULE, EXTENDED RELEASE ORAL DAILY
Qty: 90 CAPSULE | Refills: 0 | Status: SHIPPED | OUTPATIENT
Start: 2019-03-19 | End: 2019-07-27 | Stop reason: SDUPTHER

## 2019-03-19 NOTE — PSYCH
Subjective: Medication Management      Patient ID: Nerissa Mayfield is a 44 y o  male  HPI ROS Appetite Changes and Sleep: normal appetite, normal energy level, no weight change and normal number of sleep hours    Patient stated his mood has been stable and denies medication side effects  No recent health changes or new medications  He stated he was able to lower dose of Alprazolam to 0 5 mg tid  He stated daughter is getting  in [place] and he is also going on a mission trio with twin sons (15 y/o) to [place]  Review Of Systems:     Mood Anxiety and Depression   Behavior Normal    Thought Content Disturbing Thoughts, Feelings and Unreasonalbe or Irrational Fears   General Emotional Problems and Decreased Functioning   Personality Normal   Other Psych Symptoms Normal   Constitutional Negative   ENT Negative   Cardiovascular Negative   Respiratory Negative   Gastrointestinal Negative   Genitourinary Negative   Musculoskeletal Negative   Integumentary Negative   Neurological Negative   Endocrine Normal    Other Symptoms Normal              Laboratory Results: No results found for this or any previous visit      Substance Abuse History:  Social History     Substance and Sexual Activity   Drug Use No       Family Psychiatric History:   Family History   Problem Relation Age of Onset    Aneurysm Father         ABDOMINAL AORTA    Hypertension Father     Hypertension Brother     Hypertension Paternal Grandmother     Hypertension Paternal Grandfather     Other Paternal Grandfather         SUDDEN DEATH     Aneurysm Paternal Uncle         ABDOMINAL AORTA       The following portions of the patient's history were reviewed and updated as appropriate: allergies, current medications, past family history, past medical history, past social history, past surgical history and problem list     Social History     Socioeconomic History    Marital status: /Civil Union     Spouse name: Not on file    Number of children: 3    Years of education: Not on file    Highest education level: Not on file   Occupational History    Not on file   Social Needs    Financial resource strain: Not on file    Food insecurity:     Worry: Not on file     Inability: Not on file    Transportation needs:     Medical: Not on file     Non-medical: Not on file   Tobacco Use    Smoking status: Current Every Day Smoker     Packs/day: 1 00     Years: 30 00     Pack years: 30 00    Smokeless tobacco: Never Used    Tobacco comment: 0 5-1 PPD  STARTED AT 14 YRS OLD    Substance and Sexual Activity    Alcohol use: Yes     Comment: OCCASIONALLY -- COUPLE DRINKS PER WEEK     Drug use: No    Sexual activity: Not on file   Lifestyle    Physical activity:     Days per week: Not on file     Minutes per session: Not on file    Stress: Not on file   Relationships    Social connections:     Talks on phone: Not on file     Gets together: Not on file     Attends [employer] service: Not on file     Active member of club or organization: Not on file     Attends meetings of clubs or organizations: Not on file     Relationship status: Not on file    Intimate partner violence:     Fear of current or ex partner: Not on file     Emotionally abused: Not on file     Physically abused: Not on file     Forced sexual activity: Not on file   Other Topics Concern    Not on file   Social History Narrative    [address] [place] [address] -- [address] [facility] -- [address] [place] [address]     Social History     Social History Narrative    [address] [place] [address] -- [birth year][place] 2003 -- [address] [facility]       Objective:       Mental status:  Appearance calm and cooperative , adequate hygiene and grooming and good eye contact    Mood depressed and anxious   Affect affect was constricted   Speech a normal rate and fluent   Thought Processes coherent/organized and normal thought processes   Hallucinations no hallucinations present    Thought Content no delusions   Abnormal Thoughts no suicidal thoughts  and no homicidal thoughts    Orientation  oriented to person and place and time   Remote Memory short term memory intact and long term memory intact   Attention Span concentration intact   Intellect Appears to be of Average Intelligence   Insight Limited insight   Judgement judgment was limited   Muscle Strength Muscle strength and tone were normal and Normal gait    Language no difficulty naming common objects, no difficulty repeating a phrase  and no difficulty writing a sentence    Fund of Knowledge displays adequate knowledge of current events, adequate fund of knowledge regarding past history and adequate fund of knowledge regarding vocabulary    Pain none   Pain Scale 0       Assessment/Plan:       Diagnoses and all orders for this visit:    MDD (major depressive disorder), recurrent severe, without psychosis ([facility] [address] )    SUNDEEP (generalized anxiety disorder)  -     ALPRAZolam (XANAX) 0 5 mg tablet; Take 1 tablet (0 5 mg total) by mouth 3 (three) times a day as needed for anxiety for up to 30 days    MDD (major depressive disorder)  -     venlafaxine (EFFEXOR-XR) 75 mg 24 hr capsule; Take 1 capsule (75 mg total) by mouth daily            Treatment Recommendations- Risks Benefits      Immediate Medical/Psychiatric/Psychotherapy Treatments and Any Precautions: continue current treatment     Risks, Benefits And Possible Side Effects Of Medications:  {PSYCH RISK, BENEFITS AND POSSIBLE SIDE EFFECTS (Optional):88929    Controlled Medication Discussion: Discussed with patient Black Box warning on concurrent use of benzodiazepines and opioid medications including sedation, respiratory depression, coma and death  Patient understands the risk of treatment with benzodiazepines in addition to opioids and wants to continue taking those medications   , Discussed with patient the risks of sedation, respiratory depression, impairment of ability to drive and potential for abuse and addiction related to treatment with benzodiazepine medications  The patient understands risk of treatment with benzodiazepine medications, agrees to not drive if feels impaired and agrees to take medications as prescribed   and The patient has been filling controlled prescriptions on time as prescribed to [facility]

## 2019-04-14 ENCOUNTER — TRANSCRIBE ORDERS (OUTPATIENT)
Dept: ADMINISTRATIVE | Age: 49
End: 2019-04-14

## 2019-04-14 ENCOUNTER — APPOINTMENT (OUTPATIENT)
Dept: LAB | Age: 49
End: 2019-04-14
Payer: COMMERCIAL

## 2019-04-14 DIAGNOSIS — Z20.2 STD EXPOSURE: ICD-10-CM

## 2019-04-14 DIAGNOSIS — Z13.6 SCREENING FOR CARDIOVASCULAR CONDITION: ICD-10-CM

## 2019-04-14 LAB
ALBUMIN SERPL BCP-MCNC: 3.9 G/DL (ref 3.5–5)
ALP SERPL-CCNC: 97 U/L (ref 46–116)
ALT SERPL W P-5'-P-CCNC: 23 U/L (ref 12–78)
ANION GAP SERPL CALCULATED.3IONS-SCNC: 3 MMOL/L (ref 4–13)
AST SERPL W P-5'-P-CCNC: 13 U/L (ref 5–45)
BILIRUB SERPL-MCNC: 0.4 MG/DL (ref 0.2–1)
BUN SERPL-MCNC: 17 MG/DL (ref 5–25)
CALCIUM SERPL-MCNC: 8.8 MG/DL (ref 8.3–10.1)
CHLORIDE SERPL-SCNC: 106 MMOL/L (ref 100–108)
CO2 SERPL-SCNC: 26 MMOL/L (ref 21–32)
CREAT SERPL-MCNC: 0.93 MG/DL (ref 0.6–1.3)
GFR SERPL CREATININE-BSD FRML MDRD: 95 ML/MIN/1.73SQ M
GLUCOSE SERPL-MCNC: 100 MG/DL (ref 65–140)
HAV IGM SER QL: NORMAL
HBV CORE IGM SER QL: NORMAL
HBV SURFACE AG SER QL: NORMAL
HCV AB SER QL: NORMAL
POTASSIUM SERPL-SCNC: 4.2 MMOL/L (ref 3.5–5.3)
PROT SERPL-MCNC: 7.4 G/DL (ref 6.4–8.2)
SODIUM SERPL-SCNC: 135 MMOL/L (ref 136–145)

## 2019-04-14 PROCEDURE — 87591 N.GONORRHOEAE DNA AMP PROB: CPT

## 2019-04-14 PROCEDURE — 80074 ACUTE HEPATITIS PANEL: CPT

## 2019-04-14 PROCEDURE — 36415 COLL VENOUS BLD VENIPUNCTURE: CPT

## 2019-04-14 PROCEDURE — 87491 CHLMYD TRACH DNA AMP PROBE: CPT

## 2019-04-14 PROCEDURE — 86592 SYPHILIS TEST NON-TREP QUAL: CPT

## 2019-04-14 PROCEDURE — 80053 COMPREHEN METABOLIC PANEL: CPT

## 2019-04-14 PROCEDURE — 87389 HIV-1 AG W/HIV-1&-2 AB AG IA: CPT

## 2019-04-15 DIAGNOSIS — R79.89 LOW SERUM SODIUM: Primary | ICD-10-CM

## 2019-04-15 LAB
HIV 1+2 AB+HIV1 P24 AG SERPL QL IA: NORMAL
RPR SER QL: NORMAL

## 2019-04-16 LAB
C TRACH DNA SPEC QL NAA+PROBE: NEGATIVE
N GONORRHOEA DNA SPEC QL NAA+PROBE: NEGATIVE

## 2019-06-25 ENCOUNTER — OFFICE VISIT (OUTPATIENT)
Dept: PSYCHIATRY | Facility: CLINIC | Age: 49
End: 2019-06-25
Payer: COMMERCIAL

## 2019-06-25 DIAGNOSIS — F32.9 MDD (MAJOR DEPRESSIVE DISORDER): ICD-10-CM

## 2019-06-25 DIAGNOSIS — F41.1 GAD (GENERALIZED ANXIETY DISORDER): ICD-10-CM

## 2019-06-25 PROCEDURE — 99213 OFFICE O/P EST LOW 20 MIN: CPT | Performed by: PSYCHIATRY & NEUROLOGY

## 2019-07-27 DIAGNOSIS — F32.9 MDD (MAJOR DEPRESSIVE DISORDER): ICD-10-CM

## 2019-07-29 RX ORDER — VENLAFAXINE HYDROCHLORIDE 75 MG/1
CAPSULE, EXTENDED RELEASE ORAL
Qty: 90 CAPSULE | Refills: 0 | Status: SHIPPED | OUTPATIENT
Start: 2019-07-29 | End: 2019-11-04 | Stop reason: SDUPTHER

## 2019-08-06 DIAGNOSIS — F41.1 GAD (GENERALIZED ANXIETY DISORDER): ICD-10-CM

## 2019-08-06 RX ORDER — ALPRAZOLAM 0.5 MG/1
0.5 TABLET ORAL 3 TIMES DAILY PRN
Qty: 270 TABLET | Refills: 0 | Status: SHIPPED | OUTPATIENT
Start: 2019-08-06 | End: 2019-12-11 | Stop reason: SDUPTHER

## 2019-09-18 ENCOUNTER — OFFICE VISIT (OUTPATIENT)
Dept: INTERNAL MEDICINE CLINIC | Facility: CLINIC | Age: 49
End: 2019-09-18
Payer: COMMERCIAL

## 2019-09-18 VITALS
DIASTOLIC BLOOD PRESSURE: 88 MMHG | BODY MASS INDEX: 27.11 KG/M2 | RESPIRATION RATE: 16 BRPM | WEIGHT: 189.4 LBS | HEART RATE: 89 BPM | HEIGHT: 70 IN | OXYGEN SATURATION: 96 % | SYSTOLIC BLOOD PRESSURE: 124 MMHG

## 2019-09-18 DIAGNOSIS — Z13.6 SCREENING FOR CARDIOVASCULAR CONDITION: ICD-10-CM

## 2019-09-18 DIAGNOSIS — Z12.11 SCREENING FOR COLON CANCER: Primary | ICD-10-CM

## 2019-09-18 DIAGNOSIS — R73.01 IFG (IMPAIRED FASTING GLUCOSE): ICD-10-CM

## 2019-09-18 DIAGNOSIS — Z12.5 SCREENING PSA (PROSTATE SPECIFIC ANTIGEN): ICD-10-CM

## 2019-09-18 DIAGNOSIS — I10 BENIGN ESSENTIAL HYPERTENSION: ICD-10-CM

## 2019-09-18 DIAGNOSIS — Z72.0 TOBACCO ABUSE: ICD-10-CM

## 2019-09-18 PROCEDURE — 99214 OFFICE O/P EST MOD 30 MIN: CPT | Performed by: INTERNAL MEDICINE

## 2019-09-18 PROCEDURE — 3079F DIAST BP 80-89 MM HG: CPT | Performed by: INTERNAL MEDICINE

## 2019-09-18 PROCEDURE — 3008F BODY MASS INDEX DOCD: CPT | Performed by: INTERNAL MEDICINE

## 2019-09-18 PROCEDURE — 3074F SYST BP LT 130 MM HG: CPT | Performed by: INTERNAL MEDICINE

## 2019-09-18 RX ORDER — NICOTINE 21 MG/24HR
1 PATCH, TRANSDERMAL 24 HOURS TRANSDERMAL EVERY 24 HOURS
Qty: 28 PATCH | Refills: 0 | Status: SHIPPED | OUTPATIENT
Start: 2019-09-18 | End: 2019-10-18

## 2019-09-18 RX ORDER — NICOTINE 21 MG/24HR
1 PATCH, TRANSDERMAL 24 HOURS TRANSDERMAL EVERY 24 HOURS
Qty: 28 PATCH | Refills: 0 | Status: SHIPPED | OUTPATIENT
Start: 2019-09-18 | End: 2019-10-24 | Stop reason: SDUPTHER

## 2019-09-18 NOTE — PROGRESS NOTES
BMI Counseling: Body mass index is 27 18 kg/m²  Discussed the patient's BMI with him  The BMI is above normal  Nutrition recommendations include reducing portion sizes  Assessment/Plan:    IFG (impaired fasting glucose)  Pre diabetes -I have counseled the patient to follow a healthy balanced diet, I have counseled patient reduce carbohydrates and sweets in the diet, I would like the patient exercise routinely  I will be checking hemoglobin A1c and comprehensive metabolic panel  Have counseled patient about the prevention of diabetes, and the risk of progression to type 2 diabetes  Benign essential hypertension  Hypertension - controlled, I have counseled patient following healthy balance diet, I would like the patient reduce sodium, exercise routinely, I would like the patient continued the med current medical regiment and we will continue to monitor  Continue Cozaar 50 mg once daily    Screening PSA (prostate specific antigen)  Counseled, check screening PSA    Tobacco abuse  I have counseled the patient to quit smoking, I have recommended that the patient set up a quit date and I have asked the patient to cut down the cigarettes until the quit date  The patient like to use a nicotine replacement patch upon quitting no smoking when using the patch risk of death  Problem List Items Addressed This Visit        Endocrine    IFG (impaired fasting glucose)     Pre diabetes -I have counseled the patient to follow a healthy balanced diet, I have counseled patient reduce carbohydrates and sweets in the diet, I would like the patient exercise routinely  I will be checking hemoglobin A1c and comprehensive metabolic panel  Have counseled patient about the prevention of diabetes, and the risk of progression to type 2 diabetes              Cardiovascular and Mediastinum    Benign essential hypertension     Hypertension - controlled, I have counseled patient following healthy balance diet, I would like the patient reduce sodium, exercise routinely, I would like the patient continued the med current medical regiment and we will continue to monitor  Continue Cozaar 50 mg once daily            Other    Screening PSA (prostate specific antigen)     Counseled, check screening PSA         Relevant Orders    PSA, Total Screen    Tobacco abuse     I have counseled the patient to quit smoking, I have recommended that the patient set up a quit date and I have asked the patient to cut down the cigarettes until the quit date  The patient like to use a nicotine replacement patch upon quitting no smoking when using the patch risk of death  Relevant Medications    nicotine (NICODERM CQ) 21 mg/24 hr TD 24 hr patch    nicotine (NICODERM CQ) 14 mg/24hr TD 24 hr patch    nicotine (NICODERM CQ) 7 mg/24hr TD 24 hr patch      Other Visit Diagnoses     Screening for colon cancer    -  Primary    Relevant Orders    Ambulatory referral to Gastroenterology    Screening for cardiovascular condition        Relevant Orders    Comprehensive metabolic panel    Lipid Panel with Direct LDL reflex          Return to office 6  months  call if any problemsSubjective:      Patient ID: David Ramirez is a 44 y o  male  HPI 52-year old male coming in for a follow up visit regarding tobacco abuse, screening PSA, impaired fasting glucose, benign essential hypertension; The patient reports me compliant taking medications without untoward side effects the  The patient is here to review his medical condition, update me on the medical condition and the patient reports me no hospitalizations and no ER visits    patient does report me he is interested in quitting smoking 1 5 pack per day times 30 years wants to remain healthy last attempt a few yrs ago, reports me in the past he has tried Chantix but this may depression worst period    The following portions of the patient's history were reviewed and updated as appropriate: allergies, current medications, past family history, past medical history, past social history, past surgical history and problem list     Review of Systems   Constitutional: Negative for activity change, appetite change and unexpected weight change  HENT: Negative for congestion and postnasal drip  Eyes: Negative for visual disturbance  Respiratory: Negative for cough and shortness of breath  Cardiovascular: Negative for chest pain  Gastrointestinal: Negative for abdominal pain, diarrhea, nausea and vomiting  Neurological: Negative for dizziness, light-headedness and headaches  Hematological: Negative for adenopathy  Objective:    No follow-ups on file  No results found  Allergies   Allergen Reactions    Chlorthalidone      Annotation - [id number]: GOUT       Past Medical History:   Diagnosis Date    Elevated blood pressure reading     LAST ASSESSED [id number]    Gout     LAST ASSESSED [id number]    Hypertension     Social anxiety disorder     LAST ASSESSED [id number]     History reviewed  No pertinent surgical history  Current Outpatient Medications on File Prior to Visit   Medication Sig Dispense Refill    losartan (COZAAR) 50 mg tablet Take 1 tablet (50 mg total) by mouth daily 90 tablet 3    venlafaxine (EFFEXOR-XR) 75 mg 24 hr capsule TAKE 1 CAPSULE BY MOUTH EVERY DAY 90 capsule 0    ALPRAZolam (XANAX) 0 5 mg tablet Take 1 tablet (0 5 mg total) by mouth 3 (three) times a day as needed for anxiety for up to 30 days 270 tablet 0     No current facility-administered medications on file prior to visit        Family History   Problem Relation Age of Onset    Aneurysm Father         ABDOMINAL AORTA    Hypertension Father     Hypertension Brother     Hypertension Paternal Grandmother     Hypertension Paternal Grandfather     Other Paternal Grandfather         SUDDEN DEATH     Aneurysm Paternal Uncle         ABDOMINAL AORTA     Social History     Socioeconomic History    Marital status: /Civil [employer]     Spouse name: Not on file    Number of children: 3    Years of education: Not on file    Highest education level: Not on file   Occupational History    Not on file   Social Needs    Financial resource strain: Not on file    Food insecurity:     Worry: Not on file     Inability: Not on file    Transportation needs:     Medical: Not on file     Non-medical: Not on file   Tobacco Use    Smoking status: Current Every Day Smoker     Packs/day: 1 00     Years: 30 00     Pack years: 30 00    Smokeless tobacco: Never Used    Tobacco comment: 0 5-1 PPD  STARTED AT 14 YRS OLD    Substance and Sexual Activity    Alcohol use: Yes     Comment: OCCASIONALLY -- COUPLE DRINKS PER WEEK     Drug use: No    Sexual activity: Not on file   Lifestyle    Physical activity:     Days per week: Not on file     Minutes per session: Not on file    Stress: Not on file   Relationships    Social connections:     Talks on phone: Not on file     Gets together: Not on file     Attends Rastafarian service: Not on file     Active member of club or organization: Not on file     Attends meetings of clubs or organizations: Not on file     Relationship status: Not on file    Intimate partner violence:     Fear of current or ex partner: Not on file     Emotionally abused: Not on file     Physically abused: Not on file     Forced sexual activity: Not on file   Other Topics Concern    Not on file   Social History Narrative    [facility] -- [address] [place] [address] Juan:    09/18/19 1559   BP: 124/88   Pulse: 89   Resp: 16   SpO2: 96%   Weight: 85 9 kg (189 lb 6 4 oz)   Height: 5' 10" (1 778 m)     Results for orders placed or performed in visit on 04/14/19   Chlamydia/GC amplified DNA by PCR   Result Value Ref Range    N gonorrhoeae, DNA Probe Negative Negative    Chlamydia trachomatis, DNA Probe Negative Negative   Comprehensive metabolic panel   Result Value Ref Range    Sodium 135 (L) 136 - 145 mmol/L    Potassium 4 2 3 5 - 5 3 mmol/L    Chloride 106 100 - 108 mmol/L    CO2 26 21 - 32 mmol/L    ANION GAP 3 (L) 4 - 13 mmol/L    BUN 17 5 - 25 mg/dL    Creatinine 0 93 0 60 - 1 30 mg/dL    Glucose 100 65 - 140 mg/dL    Calcium 8 8 8 3 - 10 1 mg/dL    AST 13 5 - 45 U/L    ALT 23 12 - 78 U/L    Alkaline Phosphatase 97 46 - 116 U/L    Total Protein 7 4 6 4 - 8 2 g/dL    Albumin 3 9 3 5 - 5 0 g/dL    Total Bilirubin 0 40 0 20 - 1 00 mg/dL    eGFR 95 ml/min/1 73sq m   HIV 1/2 AG-AB combo   Result Value Ref Range    HIV-1/HIV-2 Ab Non-Reactive Non-Reactive   RPR   Result Value Ref Range    RPR Non-Reactive Non-Reactive   Hepatitis panel, acute   Result Value Ref Range    Hepatitis B Surface Ag Non-reactive Non-reactive, NonReactive - Confirmed    Hep A IgM Non-reactive Non-reactive, Equivocal-Suggest Recollect    Hepatitis C Ab Non-reactive Non-reactive    Hep B C IgM Non-reactive Non-reactive     Weight (last 2 days)     None        Body mass index is 27 18 kg/m²  BP      Temp      Pulse     Resp      SpO2        Vitals:    09/18/19 1559   Weight: 85 9 kg (189 lb 6 4 oz)     Vitals:    09/18/19 1559   Weight: 85 9 kg (189 lb 6 4 oz)       /88   Pulse 89   Resp 16   Ht 5' 10" (1 778 m)   Wt 85 9 kg (189 lb 6 4 oz)   SpO2 96%   BMI 27 18 kg/m²          Physical Exam   Constitutional: He appears well-developed and well-nourished  No distress  HENT:   Head: Normocephalic and atraumatic  Right Ear: External ear normal    Left Ear: External ear normal    Mouth/Throat: Oropharynx is clear and moist    Eyes: Pupils are equal, round, and reactive to light  Conjunctivae are normal  Right eye exhibits no discharge  Left eye exhibits no discharge  No scleral icterus  Neck: Neck supple  Cardiovascular: Normal rate, regular rhythm and normal heart sounds  Exam reveals no gallop and no friction rub  No murmur heard  Pulmonary/Chest: No respiratory distress  He has no wheezes  He has no rales  Abdominal: Soft  Bowel sounds are normal  He exhibits no distension and no mass  There is no tenderness  There is no rebound and no guarding  Musculoskeletal: He exhibits no edema or deformity  Lymphadenopathy:     He has no cervical adenopathy  Neurological: He is alert  Skin: He is not diaphoretic  Psychiatric: He has a normal mood and affect

## 2019-09-22 NOTE — ASSESSMENT & PLAN NOTE
Hypertension - controlled, I have counseled patient following healthy balance diet, I would like the patient reduce sodium, exercise routinely, I would like the patient continued the med current medical regiment and we will continue to monitor    Continue Cozaar 50 mg once daily

## 2019-09-22 NOTE — ASSESSMENT & PLAN NOTE
I have counseled the patient to quit smoking, I have recommended that the patient set up a quit date and I have asked the patient to cut down the cigarettes until the quit date  The patient like to use a nicotine replacement patch upon quitting no smoking when using the patch risk of death

## 2019-09-30 ENCOUNTER — DOCUMENTATION (OUTPATIENT)
Dept: PSYCHIATRY | Facility: CLINIC | Age: 49
End: 2019-09-30

## 2019-09-30 NOTE — PROGRESS NOTES
Treatment Plan not completed within required time limits due to: Aditya Diaz  cancelled appointment  on 9/27/2019

## 2019-10-24 DIAGNOSIS — Z72.0 TOBACCO ABUSE: ICD-10-CM

## 2019-10-24 RX ORDER — NICOTINE 14MG/24HR
PATCH, TRANSDERMAL 24 HOURS TRANSDERMAL
Qty: 28 PATCH | Refills: 0 | Status: SHIPPED | OUTPATIENT
Start: 2019-10-24 | End: 2021-12-07 | Stop reason: SDUPTHER

## 2019-11-04 DIAGNOSIS — F32.9 MDD (MAJOR DEPRESSIVE DISORDER): ICD-10-CM

## 2019-11-04 RX ORDER — VENLAFAXINE HYDROCHLORIDE 75 MG/1
75 CAPSULE, EXTENDED RELEASE ORAL DAILY
Qty: 90 CAPSULE | Refills: 0 | Status: SHIPPED | OUTPATIENT
Start: 2019-11-04 | End: 2020-02-03

## 2019-11-26 DIAGNOSIS — I10 ESSENTIAL HYPERTENSION: ICD-10-CM

## 2019-11-26 RX ORDER — LOSARTAN POTASSIUM 50 MG/1
50 TABLET ORAL DAILY
Qty: 90 TABLET | Refills: 3 | Status: SHIPPED | OUTPATIENT
Start: 2019-11-26 | End: 2020-12-07 | Stop reason: SDUPTHER

## 2019-12-11 ENCOUNTER — OFFICE VISIT (OUTPATIENT)
Dept: PSYCHIATRY | Facility: CLINIC | Age: 49
End: 2019-12-11
Payer: COMMERCIAL

## 2019-12-11 DIAGNOSIS — F41.1 GAD (GENERALIZED ANXIETY DISORDER): ICD-10-CM

## 2019-12-11 DIAGNOSIS — F33.2 MDD (MAJOR DEPRESSIVE DISORDER), RECURRENT SEVERE, WITHOUT PSYCHOSIS (HCC): Primary | ICD-10-CM

## 2019-12-11 PROCEDURE — 99213 OFFICE O/P EST LOW 20 MIN: CPT | Performed by: PSYCHIATRY & NEUROLOGY

## 2019-12-11 RX ORDER — ALPRAZOLAM 0.5 MG/1
0.5 TABLET ORAL 3 TIMES DAILY PRN
Qty: 270 TABLET | Refills: 0 | Status: SHIPPED | OUTPATIENT
Start: 2019-12-11 | End: 2020-05-06 | Stop reason: SDUPTHER

## 2019-12-11 NOTE — PSYCH
Subjective: Medication Management      Patient ID: Sushila Biswas is a 44 y o  male  HPI ROS Appetite Changes and Sleep: normal appetite, normal energy level, no weight change and normal number of sleep hours     Patient was last seen about 6 months ago  He stated his mood has been stable and denies medication side effects  No recent health changes or new medications  He stated he is planning to quit smoking by the end of this month  He stated he had quit 15 years ago and he is hopeful that he could quit again  Review Of Systems:     Mood Anxiety and Depression   Behavior Normal    Thought Content Disturbing Thoughts, Feelings   General Emotional Problems and Decreased Functioning   Personality Normal   Other Psych Symptoms Normal   Constitutional Negative   ENT Negative   Cardiovascular Negative   Respiratory Negative   Gastrointestinal Negative   Genitourinary Negative   Musculoskeletal Negative   Integumentary Negative   Neurological Negative   Endocrine Normal    Other Symptoms Normal              Laboratory Results: No results found for this or any previous visit      Substance Abuse History:  Social History     Substance and Sexual Activity   Drug Use No       Family Psychiatric History:   Family History   Problem Relation Age of Onset    Aneurysm Father         ABDOMINAL AORTA    Hypertension Father     Hypertension Brother     Hypertension Paternal Grandmother     Hypertension Paternal Grandfather     Other Paternal Grandfather         SUDDEN DEATH     Aneurysm Paternal Uncle         ABDOMINAL AORTA       The following portions of the patient's history were reviewed and updated as appropriate: allergies, current medications, past family history, past medical history, past social history, past surgical history and problem list     Social History     Socioeconomic History    Marital status: /Civil Union     Spouse name: Not on file    Number of children: 3    Years of education: Not on file    Highest education level: Not on file   Occupational History    Not on file   Social Needs    Financial resource strain: Not on file    Food insecurity:     Worry: Not on file     Inability: Not on file    Transportation needs:     Medical: Not on file     Non-medical: Not on file   Tobacco Use    Smoking status: Current Every Day Smoker     Packs/day: 1 00     Years: 30 00     Pack years: 30 00    Smokeless tobacco: Never Used    Tobacco comment: 0 5-1 PPD  STARTED AT 14 YRS OLD    Substance and Sexual Activity    Alcohol use: Yes     Comment: OCCASIONALLY -- COUPLE DRINKS PER WEEK     Drug use: No    Sexual activity: Not on file   Lifestyle    Physical activity:     Days per week: Not on file     Minutes per session: Not on file    Stress: Not on file   Relationships    Social connections:     Talks on phone: Not on file     Gets together: Not on file     Attends Voodoo service: Not on file     Active member of club or organization: Not on file     Attends meetings of clubs or organizations: Not on file     Relationship status: Not on file    Intimate partner violence:     Fear of current or ex partner: Not on file     Emotionally abused: Not on file     Physically abused: Not on file     Forced sexual activity: Not on file   Other Topics Concern    Not on file   Social History Narrative    [address] [facility] -- [address] [facility] -- [address] [facility]     Social History     Social History Narrative    [address] [place] [address] -- [place] 2003 -- [address] [facility]       Objective:       Mental status:  Appearance calm and cooperative , adequate hygiene and grooming and good eye contact    Mood dysphoric   Affect affect was constricted   Speech a normal rate and fluent   Thought Processes coherent/organized and normal thought processes   Hallucinations no hallucinations present    Thought Content no delusions   Abnormal Thoughts no suicidal thoughts  and no homicidal thoughts    Orientation oriented to person and place and time   Remote Memory short term memory intact and long term memory intact   Attention Span concentration intact   Intellect Appears to be of Average Intelligence   Insight Limited insight   Judgement judgment was limited   Muscle Strength Muscle strength and tone were normal and Normal gait    Language no difficulty naming common objects, no difficulty repeating a phrase  and no difficulty writing a sentence    Fund of Knowledge displays adequate knowledge of current events, adequate fund of knowledge regarding past history and adequate fund of knowledge regarding vocabulary                Assessment/Plan:       Diagnoses and all orders for this visit:    SUNDEEP (generalized anxiety disorder)  -     ALPRAZolam (XANAX) 0 5 mg tablet; Take 1 tablet (0 5 mg total) by mouth 3 (three) times a day as needed for anxiety            Treatment Recommendations- Risks Benefits      Immediate Medical/Psychiatric/Psychotherapy Treatments and Any Precautions: continue current treatment     Risks, Benefits And Possible Side Effects Of Medications:  {PSYCH RISK, BENEFITS AND POSSIBLE SIDE EFFECTS (Optional):75913    Controlled Medication Discussion: Discussed with patient Black Box warning on concurrent use of benzodiazepines and opioid medications including sedation, respiratory depression, coma and death  Patient understands the risk of treatment with benzodiazepines in addition to opioids and wants to continue taking those medications  , Discussed with patient the risks of sedation, respiratory depression, impairment of ability to drive and potential for abuse and addiction related to treatment with benzodiazepine medications  The patient understands risk of treatment with benzodiazepine medications, agrees to not drive if feels impaired and agrees to take medications as prescribed   and The patient has been filling controlled prescriptions on time as prescribed to [place] [facility] program

## 2019-12-11 NOTE — BH TREATMENT PLAN
TREATMENT PLAN (Medication Management Only)        [facility]    Name and Date of Birth:  Amanda Perez 44 y o  1970  Date of Treatment Plan: December 11, 2019  Diagnosis/Diagnoses:    1  MDD (major depressive disorder), recurrent severe, without psychosis ([facility] [address] )    2  SUNDEEP (generalized anxiety disorder)      Strengths/Personal Resources for Self-Care: taking medications as prescribed, ability to communicate needs, motivation for treatment  Area/Areas of need (in own words): anxiety, anxiety symptoms, depression, depressive symptoms  1  Long Term Goal: continue improvement in depression  Target Date: 2 months - 2/11/2020  Person/Persons responsible for completion of goal: Tr Alfaro  2  Short Term Objective (s) - How will we reach this goal?:   A  Provider new recommended medication/dosage changes and/or continue medication(s): continue current medications as prescribed  B  N/A   C  N/A  Target Date: 6 months - 6/11/2020  Person/Persons Responsible for Completion of Goal: Tr Alfaro  Progress Towards Goals: continuing treatment  Treatment Modality: medication management every 6 months  Review due 90 to 120 days from date of this plan: 6 months   Expected length of service: maintenance  My Physician/PA/NP and I have developed this plan together and I agree to work on the goals and objectives  I understand the treatment goals that were developed for my treatment

## 2020-02-01 DIAGNOSIS — F32.9 MDD (MAJOR DEPRESSIVE DISORDER): ICD-10-CM

## 2020-02-03 RX ORDER — VENLAFAXINE HYDROCHLORIDE 75 MG/1
CAPSULE, EXTENDED RELEASE ORAL
Qty: 90 CAPSULE | Refills: 0 | Status: SHIPPED | OUTPATIENT
Start: 2020-02-03 | End: 2020-05-04

## 2020-05-04 DIAGNOSIS — F32.9 MDD (MAJOR DEPRESSIVE DISORDER): ICD-10-CM

## 2020-05-04 RX ORDER — VENLAFAXINE HYDROCHLORIDE 75 MG/1
CAPSULE, EXTENDED RELEASE ORAL
Qty: 90 CAPSULE | Refills: 0 | Status: SHIPPED | OUTPATIENT
Start: 2020-05-04 | End: 2020-08-03

## 2020-05-06 DIAGNOSIS — F41.1 GAD (GENERALIZED ANXIETY DISORDER): ICD-10-CM

## 2020-05-06 RX ORDER — ALPRAZOLAM 0.5 MG/1
0.5 TABLET ORAL 3 TIMES DAILY PRN
Qty: 270 TABLET | Refills: 0 | Status: SHIPPED | OUTPATIENT
Start: 2020-05-06 | End: 2020-09-17 | Stop reason: SDUPTHER

## 2020-06-09 ENCOUNTER — DOCUMENTATION (OUTPATIENT)
Dept: PSYCHIATRY | Facility: CLINIC | Age: 50
End: 2020-06-09

## 2020-06-10 ENCOUNTER — TELEMEDICINE (OUTPATIENT)
Dept: PSYCHIATRY | Facility: CLINIC | Age: 50
End: 2020-06-10
Payer: COMMERCIAL

## 2020-06-10 DIAGNOSIS — F33.2 MDD (MAJOR DEPRESSIVE DISORDER), RECURRENT SEVERE, WITHOUT PSYCHOSIS (HCC): ICD-10-CM

## 2020-06-10 DIAGNOSIS — F41.1 GAD (GENERALIZED ANXIETY DISORDER): Primary | ICD-10-CM

## 2020-06-10 PROCEDURE — 99214 OFFICE O/P EST MOD 30 MIN: CPT | Performed by: PSYCHIATRY & NEUROLOGY

## 2020-07-02 ENCOUNTER — TELEMEDICINE (OUTPATIENT)
Dept: INTERNAL MEDICINE CLINIC | Facility: CLINIC | Age: 50
End: 2020-07-02
Payer: COMMERCIAL

## 2020-07-02 DIAGNOSIS — R73.01 IFG (IMPAIRED FASTING GLUCOSE): ICD-10-CM

## 2020-07-02 DIAGNOSIS — I10 ESSENTIAL HYPERTENSION: Primary | ICD-10-CM

## 2020-07-02 DIAGNOSIS — Z12.5 SCREENING PSA (PROSTATE SPECIFIC ANTIGEN): ICD-10-CM

## 2020-07-02 DIAGNOSIS — Z12.11 SCREENING FOR COLON CANCER: ICD-10-CM

## 2020-07-02 DIAGNOSIS — Z72.0 TOBACCO ABUSE: ICD-10-CM

## 2020-07-02 DIAGNOSIS — I10 BENIGN ESSENTIAL HYPERTENSION: ICD-10-CM

## 2020-07-02 PROCEDURE — 3079F DIAST BP 80-89 MM HG: CPT | Performed by: INTERNAL MEDICINE

## 2020-07-02 PROCEDURE — 3074F SYST BP LT 130 MM HG: CPT | Performed by: INTERNAL MEDICINE

## 2020-07-02 PROCEDURE — 99214 OFFICE O/P EST MOD 30 MIN: CPT | Performed by: INTERNAL MEDICINE

## 2020-07-05 PROBLEM — Z12.11 SCREENING FOR COLON CANCER: Status: ACTIVE | Noted: 2020-07-05

## 2020-07-05 NOTE — PROGRESS NOTES
Virtual Regular Visit      Assessment/Plan:    Problem List Items Addressed This Visit        Endocrine    IFG (impaired fasting glucose)     Pre diabetes -I have counseled the patient to follow a healthy balanced diet, I have counseled patient reduce carbohydrates and sweets in the diet, I would like the patient exercise routinely  I will be checking hemoglobin A1c and comprehensive metabolic panel  Have counseled patient about the prevention of diabetes, and the risk of progression to type 2 diabetes  Cardiovascular and Mediastinum    Benign essential hypertension     Hypertension - controlled, I have counseled patient following healthy balance diet, I would like the patient reduce sodium, exercise routinely, I would like the patient continued the med current medical regiment and we will continue to monitor  Cozaar 50 mg once daily         Essential hypertension - Primary    Relevant Orders    Comprehensive metabolic panel    Lipid Panel with Direct LDL reflex       Other    Screening PSA (prostate specific antigen)     Counseled, check screening PSA         Relevant Orders    PSA, Total Screen    Tobacco abuse     I have counseled the patient to quit smoking, I have recommended that the patient set up a quit date and I have asked the patient to cut down the cigarettes until the quit date  Screening for colon cancer     Counseled, will have the patient see GI for screening colonoscopy         Relevant Orders    Ambulatory referral to Gastroenterology          BMI Counseling: There is no height or weight on file to calculate BMI  The BMI is above normal  Nutrition recommendations include decreasing portion sizes and moderation in carbohydrate intake  Exercise recommendations include exercising 3-5 times per week         Return to office 6  months  call if any problems    Reason for visit is   Chief Complaint   Patient presents with    Follow-up     6 month follow up    Virtual Regular Visit  Virtual Regular Visit        Encounter provider Ardia Canavan, DO    Provider located at [address] [facility]  [address] [facility] [address]      Recent Visits  Date Type Provider Dept   07/02/20 [id number] Patriciahisergio DO Martina [address] [facility] recent visits within past 7 days and meeting all other requirements     Future Appointments  No visits were found meeting these conditions  Showing future appointments within next 150 days and meeting all other requirements        The patient was identified by name and date of birth  Amalia Lala was informed that this is a telemedicine visit and that the visit is being conducted through [facility] and patient was informed that this is a secure, HIPAA-compliant platform  He agrees to proceed     My office door was closed  No one else was in the room  He acknowledged consent and understanding of privacy and security of the video platform  The patient has agreed to participate and understands they can discontinue the visit at any time  Patient is aware this is a billable service  Subjective  Amalia Lala is a 44 y o  male    HPI 51-year old male coming in for a follow up visit regarding hypertension, tobacco abuse, prediabetes, screening for colon cancer; The patient reports me compliant taking medications without untoward side effects the  The patient is here to review his medical condition, update me on the medical condition and the patient reports me no hospitalizations and no ER visits  Reports me cases smoke but Chioma future trying to follow healthy/balance diet    Past Medical History:   Diagnosis Date    Elevated blood pressure reading     LAST ASSESSED 12NOV2013    Gout     LAST ASSESSED 25NOV2016    Hypertension     Social anxiety disorder     LAST ASSESSED [id number]       No past surgical history on file      Current Outpatient Medications   Medication Sig Dispense Refill    [facility] NICOTINE TRANSDERMAL SYS 14 MG/24HR TD 24 hr patch PLACE 1 PATCH ON THE SKIN EVERY 24 HOURS 28 patch 0    losartan (COZAAR) 50 mg tablet Take 1 tablet (50 mg total) by mouth daily 90 tablet 3    venlafaxine (EFFEXOR-XR) 75 mg 24 hr capsule TAKE 1 CAPSULE BY MOUTH EVERY DAY 90 capsule 0    ALPRAZolam (XANAX) 0 5 mg tablet Take 1 tablet (0 5 mg total) by mouth 3 (three) times a day as needed for anxiety 270 tablet 0    nicotine (NICODERM CQ) 21 mg/24 hr TD 24 hr patch Place 1 patch on the skin every 24 hours 28 patch 0    nicotine (NICODERM CQ) 7 mg/24hr TD 24 hr patch Place 1 patch on the skin every 24 hours for 14 days 14 patch 0     No current facility-administered medications for this visit  Allergies   Allergen Reactions    Chlorthalidone      Annotation - [id number]: GOUT       Review of Systems   Constitutional: Negative for activity change, appetite change and unexpected weight change  HENT: Negative for congestion and postnasal drip  Respiratory: Negative for cough and shortness of breath  Cardiovascular: Negative for chest pain  Gastrointestinal: Negative for abdominal pain, diarrhea, nausea and vomiting  Neurological: Negative for dizziness, light-headedness and headaches  Video Exam    There were no vitals filed for this visit  Physical Exam     As a result of this visit, I have not referred the patient for further respiratory evaluation  I spent 25 minutes directly with the patient during this visit      VIRTUAL VISIT DISCLAIMER    Faith Muñoz acknowledges that he has consented to an online visit or consultation  He understands that the online visit is based solely on information provided by him, and that, in the absence of a face-to-face physical evaluation by the physician, the diagnosis he receives is both limited and provisional in terms of accuracy and completeness  This is not intended to replace a full medical face-to-face evaluation by the physician   Ed Cardoza Talia Cabello understands and accepts these terms

## 2020-07-05 NOTE — ASSESSMENT & PLAN NOTE
Hypertension - controlled, I have counseled patient following healthy balance diet, I would like the patient reduce sodium, exercise routinely, I would like the patient continued the med current medical regiment and we will continue to monitor    Cozaar 50 mg once daily

## 2020-08-01 DIAGNOSIS — F32.9 MDD (MAJOR DEPRESSIVE DISORDER): ICD-10-CM

## 2020-08-03 RX ORDER — VENLAFAXINE HYDROCHLORIDE 75 MG/1
CAPSULE, EXTENDED RELEASE ORAL
Qty: 90 CAPSULE | Refills: 0 | Status: SHIPPED | OUTPATIENT
Start: 2020-08-03 | End: 2020-11-06 | Stop reason: SDUPTHER

## 2020-09-17 DIAGNOSIS — F41.1 GAD (GENERALIZED ANXIETY DISORDER): ICD-10-CM

## 2020-09-19 RX ORDER — ALPRAZOLAM 0.5 MG/1
0.5 TABLET ORAL 3 TIMES DAILY PRN
Qty: 270 TABLET | Refills: 0 | Status: SHIPPED | OUTPATIENT
Start: 2020-09-19 | End: 2021-02-03 | Stop reason: SDUPTHER

## 2020-11-06 ENCOUNTER — TELEPHONE (OUTPATIENT)
Dept: PSYCHIATRY | Facility: CLINIC | Age: 50
End: 2020-11-06

## 2020-11-06 DIAGNOSIS — F32.9 MDD (MAJOR DEPRESSIVE DISORDER): ICD-10-CM

## 2020-11-06 RX ORDER — VENLAFAXINE HYDROCHLORIDE 75 MG/1
75 CAPSULE, EXTENDED RELEASE ORAL DAILY
Qty: 90 CAPSULE | Refills: 0 | Status: SHIPPED | OUTPATIENT
Start: 2020-11-06 | End: 2021-02-03 | Stop reason: SDUPTHER

## 2020-12-07 DIAGNOSIS — I10 ESSENTIAL HYPERTENSION: ICD-10-CM

## 2020-12-07 RX ORDER — LOSARTAN POTASSIUM 50 MG/1
50 TABLET ORAL DAILY
Qty: 90 TABLET | Refills: 3 | Status: SHIPPED | OUTPATIENT
Start: 2020-12-07 | End: 2021-03-08

## 2021-02-03 DIAGNOSIS — F32.9 MDD (MAJOR DEPRESSIVE DISORDER): ICD-10-CM

## 2021-02-03 DIAGNOSIS — F41.1 GAD (GENERALIZED ANXIETY DISORDER): ICD-10-CM

## 2021-02-03 RX ORDER — ALPRAZOLAM 0.5 MG/1
0.5 TABLET ORAL 3 TIMES DAILY PRN
Qty: 270 TABLET | Refills: 0 | Status: SHIPPED | OUTPATIENT
Start: 2021-02-03 | End: 2021-06-16 | Stop reason: SDUPTHER

## 2021-02-03 RX ORDER — VENLAFAXINE HYDROCHLORIDE 75 MG/1
75 CAPSULE, EXTENDED RELEASE ORAL DAILY
Qty: 90 CAPSULE | Refills: 0 | Status: SHIPPED | OUTPATIENT
Start: 2021-02-03 | End: 2021-02-04

## 2021-02-04 DIAGNOSIS — F32.9 MDD (MAJOR DEPRESSIVE DISORDER): ICD-10-CM

## 2021-02-04 RX ORDER — VENLAFAXINE HYDROCHLORIDE 75 MG/1
CAPSULE, EXTENDED RELEASE ORAL
Qty: 90 CAPSULE | Refills: 0 | Status: SHIPPED | OUTPATIENT
Start: 2021-02-04 | End: 2021-08-17 | Stop reason: SDUPTHER

## 2021-03-07 DIAGNOSIS — I10 ESSENTIAL HYPERTENSION: ICD-10-CM

## 2021-03-08 RX ORDER — LOSARTAN POTASSIUM 50 MG/1
TABLET ORAL
Qty: 90 TABLET | Refills: 3 | Status: SHIPPED | OUTPATIENT
Start: 2021-03-08 | End: 2021-12-03

## 2021-04-26 ENCOUNTER — TELEPHONE (OUTPATIENT)
Dept: PSYCHIATRY | Facility: CLINIC | Age: 51
End: 2021-04-26

## 2021-04-26 NOTE — TELEPHONE ENCOUNTER
Spoke with patient  He has not been seen since 6/2020 and would like to continue services  By the time he is scheduled, it will be past one year, so he'll need 60 minute re-eval  Transferred him to Intake

## 2021-04-26 NOTE — TELEPHONE ENCOUNTER
Dr Chichi Lazaro this pt hasnt been seen by you since June of last year do you want this pt to go back through Intake of will you see her/w/out having to go through intake? Please advise if you will forward message to Zeynep Wasserman to schedule   Thank you

## 2021-06-10 ENCOUNTER — VBI (OUTPATIENT)
Dept: ADMINISTRATIVE | Facility: OTHER | Age: 51
End: 2021-06-10

## 2021-06-16 DIAGNOSIS — F41.1 GAD (GENERALIZED ANXIETY DISORDER): ICD-10-CM

## 2021-06-16 RX ORDER — ALPRAZOLAM 0.5 MG/1
0.5 TABLET ORAL 3 TIMES DAILY PRN
Qty: 270 TABLET | Refills: 0 | Status: SHIPPED | OUTPATIENT
Start: 2021-06-16 | End: 2021-10-26 | Stop reason: SDUPTHER

## 2021-06-17 NOTE — TELEPHONE ENCOUNTER
Attempted to call Janelle Haas to inform him of refill being sent to pharmacy but voice mailbox was full

## 2021-08-17 ENCOUNTER — OFFICE VISIT (OUTPATIENT)
Dept: PSYCHIATRY | Facility: CLINIC | Age: 51
End: 2021-08-17
Payer: COMMERCIAL

## 2021-08-17 DIAGNOSIS — F41.1 GAD (GENERALIZED ANXIETY DISORDER): ICD-10-CM

## 2021-08-17 DIAGNOSIS — F32.9 MDD (MAJOR DEPRESSIVE DISORDER): ICD-10-CM

## 2021-08-17 DIAGNOSIS — F33.2 MDD (MAJOR DEPRESSIVE DISORDER), RECURRENT SEVERE, WITHOUT PSYCHOSIS (HCC): Primary | Chronic | ICD-10-CM

## 2021-08-17 PROCEDURE — 99213 OFFICE O/P EST LOW 20 MIN: CPT | Performed by: PSYCHIATRY & NEUROLOGY

## 2021-08-17 PROCEDURE — 90833 PSYTX W PT W E/M 30 MIN: CPT | Performed by: PSYCHIATRY & NEUROLOGY

## 2021-08-17 RX ORDER — VENLAFAXINE HYDROCHLORIDE 75 MG/1
75 CAPSULE, EXTENDED RELEASE ORAL DAILY
Qty: 90 CAPSULE | Refills: 0 | Status: SHIPPED | OUTPATIENT
Start: 2021-08-17 | End: 2021-11-14

## 2021-08-17 NOTE — PSYCH
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  Review Of Systems:     Mood Anxiety and Depression   Behavior Normal    Thought Content Disturbing Thoughts, Feelings   General Emotional Problems and Decreased Functioning   Personality Change in Personality   Other Psych Symptoms Normal   Constitutional Negative   ENT Negative   Cardiovascular Negative   Respiratory Negative   Gastrointestinal Negative   Genitourinary Negative   Musculoskeletal Negative   Integumentary Negative   Neurological Negative   Endocrine Normal    Other Symptoms Normal              Laboratory Results: No results found for this or any previous visit      Substance Abuse History:  Social History     Substance and Sexual Activity   Drug Use No       Family Psychiatric History:   Family History   Problem Relation Age of Onset    Aneurysm Father         ABDOMINAL AORTA    Hypertension Father     Hypertension Brother     Hypertension Paternal Grandmother     Hypertension Paternal Grandfather     Other Paternal Grandfather         SUDDEN DEATH     Aneurysm Paternal Uncle         ABDOMINAL AORTA       The following portions of the patient's history were reviewed and updated as appropriate: allergies, current medications, past family history, past medical history, past social history, past surgical history and problem list     Social History     Socioeconomic History    Marital status: /Civil Union     Spouse name: Not on file    Number of children: 3    Years of education: Not on file    Highest education level: Not on file   Occupational History    Not on file   Tobacco Use    Smoking status: Current Every Day Smoker     Packs/day: 1 00     Years: 30 00     Pack years: 30 00    Smokeless tobacco: Never Used    Tobacco comment: 0 5-1 PPD  STARTED AT 14 YRS OLD    Substance and Sexual Activity    Alcohol use: Yes     Comment: OCCASIONALLY -- COUPLE DRINKS PER WEEK     Drug use: No    Sexual activity: Not on file   Other Topics Concern    Not on file   Social History Narrative    [address] [place] [address]     Social Determinants of Health     Financial Resource Strain:     Difficulty of Paying Living Expenses:    Food Insecurity:     Worried About Running Out of Food in the Last Year:     Ran Out of Food in the Last Year:    Transportation Needs:     Lack of Transportation (Medical):      Lack of Transportation (Non-Medical):    Physical Activity:     Days of Exercise per Week:     Minutes of Exercise per Session:    Stress:     Feeling of Stress :    Social Connections:     Frequency of Communication with Friends and Family:     Frequency of Social Gatherings with Friends and Family:     Attends Congregational Services:     Active Member of Clubs or Organizations:     Attends Club or Organization Meetings:     Marital Status:    Intimate Partner Violence:     Fear of Current or Ex-Partner:     Emotionally Abused:     Physically Abused:     Sexually Abused:      Social History     Social History Narrative    [address] [facility] -- [address]       Objective:       Mental status:  Appearance calm and cooperative , adequate hygiene and grooming and good eye contact    Mood dysphoric   Affect affect was constricted   Speech a normal rate and fluent   Thought Processes coherent/organized and normal thought processes   Hallucinations no hallucinations present    Thought Content no delusions   Abnormal Thoughts no suicidal thoughts  and no homicidal thoughts    Orientation  oriented to person and place and time   Remote Memory short term memory intact and long term memory intact   Attention Span concentration intact   Intellect Appears to be of Average Intelligence   Insight Limited insight   Judgement judgment was limited   Muscle Strength Muscle strength and tone were normal and Normal gait    Language no difficulty naming common objects and no difficulty repeating a phrase    Fund of Knowledge displays adequate knowledge of current events               Assessment/Plan:       Diagnoses and all orders for this visit:    MDD (major depressive disorder), recurrent severe, without psychosis ([facility] [address] )    SUNDEEP (generalized anxiety disorder)    MDD (major depressive disorder)  -     venlafaxine (EFFEXOR-XR) 75 mg 24 hr capsule; Take 1 capsule (75 mg total) by mouth daily            Treatment Recommendations- Risks Benefits      Immediate Medical/Psychiatric/Psychotherapy Treatments and Any Precautions: continue current treatment     Risks, Benefits And Possible Side Effects Of Medications:  {PSYCH RISK, BENEFITS AND POSSIBLE SIDE EFFECTS (Optional):31620    Controlled Medication Discussion: Discussed with patient Black Box warning on concurrent use of benzodiazepines and opioid medications including sedation, respiratory depression, coma and death  Patient understands the risk of treatment with benzodiazepines in addition to opioids and wants to continue taking those medications  , Discussed with patient the risks of sedation, respiratory depression, impairment of ability to drive and potential for abuse and addiction related to treatment with benzodiazepine medications   The patient understands risk of treatment with benzodiazepine medications, agrees to not drive if feels impaired and agrees to take medications as prescribed  and The patient has been filling controlled prescriptions on time as prescribed to [facility]       Psychotherapy Provided:     Individual psychotherapy provided: Yes  Counseling was provided during the session today for 16 minutes  Medications, treatment progress and treatment plan reviewed with Emma Fox  Medication education provided to Emma Fox  Goals discussed during in session: continue improvement in depression  Recent stressor including COVID-19 issues, job stress, health issues, limited support, social difficulties and ongoing anxiety discussed with Emma Fox  Coping strategies including compliance with medications, contacting a therapist, deep/slow breathing, eliminating avoidance, engaging in previously avoided activities, exercising, getting into a good routine, improving self-esteem, increasing energy, increasing interest in usual activities, increasing motivation, increasing social contact, maintain healthy diet, maintain heathy sleeping hygiene and maintain positive attitude reviewed with Emma Fox  Importance of medication and treatment compliance reviewed with Emma Fox  Educated on importance of medication and treatment compliance  Importance of follow up with family physician for medical issues reviewed with Emma Fox  Supportive therapy provided

## 2021-08-17 NOTE — BH TREATMENT PLAN
TREATMENT PLAN (Medication Management Only)        [facility]    Name and Date of Birth:  Nalini Feliciano 46 y o  1970  Date of Treatment Plan: August 17, 2021  Diagnosis/Diagnoses:    1  MDD (major depressive disorder), recurrent severe, without psychosis ([facility] [address] )    2  SUNDEEP (generalized anxiety disorder)      Strengths/Personal Resources for Self-Care: taking medications as prescribed, ability to communicate needs  Area/Areas of need (in own words): anxiety, anxiety symptoms, depression, depressive symptoms  1  Long Term Goal: continue improvement in depression  Target Date:6 months - 2/17/2022  Person/Persons responsible for completion of goal: Yara Piedra  2  Short Term Objective (s) - How will we reach this goal?:   A  Provider new recommended medication/dosage changes and/or continue medication(s): continue current medications as prescribed  B  N/A   C  N/A  Target Date:6 months - 2/17/2022  Person/Persons Responsible for Completion of Goal: Yara Piedra  Progress Towards Goals: continuing treatment  Treatment Modality: medication management every 6 months  Review due 180 days from date of this plan: 6 months - 2/17/2022  Expected length of service: ongoing treatment  My Physician/PA/NP and I have developed this plan together and I agree to work on the goals and objectives  I understand the treatment goals that were developed for my treatment

## 2021-10-06 ENCOUNTER — OFFICE VISIT (OUTPATIENT)
Dept: INTERNAL MEDICINE CLINIC | Facility: CLINIC | Age: 51
End: 2021-10-06
Payer: COMMERCIAL

## 2021-10-06 VITALS — BODY MASS INDEX: 27.55 KG/M2 | SYSTOLIC BLOOD PRESSURE: 142 MMHG | DIASTOLIC BLOOD PRESSURE: 80 MMHG | WEIGHT: 192 LBS

## 2021-10-06 DIAGNOSIS — I10 BENIGN ESSENTIAL HYPERTENSION: ICD-10-CM

## 2021-10-06 DIAGNOSIS — G44.209 ACUTE NON INTRACTABLE TENSION-TYPE HEADACHE: Primary | ICD-10-CM

## 2021-10-06 PROCEDURE — 99213 OFFICE O/P EST LOW 20 MIN: CPT | Performed by: NURSE PRACTITIONER

## 2021-10-26 DIAGNOSIS — F41.1 GAD (GENERALIZED ANXIETY DISORDER): ICD-10-CM

## 2021-10-26 RX ORDER — ALPRAZOLAM 0.5 MG/1
0.5 TABLET ORAL 3 TIMES DAILY PRN
Qty: 270 TABLET | Refills: 0 | Status: SHIPPED | OUTPATIENT
Start: 2021-10-26 | End: 2022-02-17 | Stop reason: SDUPTHER

## 2021-11-13 DIAGNOSIS — F32.9 MDD (MAJOR DEPRESSIVE DISORDER): ICD-10-CM

## 2021-11-14 RX ORDER — VENLAFAXINE HYDROCHLORIDE 75 MG/1
CAPSULE, EXTENDED RELEASE ORAL
Qty: 90 CAPSULE | Refills: 0 | Status: SHIPPED | OUTPATIENT
Start: 2021-11-14 | End: 2022-02-10

## 2021-12-03 DIAGNOSIS — I10 ESSENTIAL HYPERTENSION: ICD-10-CM

## 2021-12-03 RX ORDER — LOSARTAN POTASSIUM 50 MG/1
TABLET ORAL
Qty: 90 TABLET | Refills: 2 | Status: SHIPPED | OUTPATIENT
Start: 2021-12-03

## 2021-12-07 ENCOUNTER — OFFICE VISIT (OUTPATIENT)
Dept: INTERNAL MEDICINE CLINIC | Facility: CLINIC | Age: 51
End: 2021-12-07
Payer: COMMERCIAL

## 2021-12-07 VITALS
BODY MASS INDEX: 27.03 KG/M2 | SYSTOLIC BLOOD PRESSURE: 142 MMHG | WEIGHT: 188.8 LBS | DIASTOLIC BLOOD PRESSURE: 84 MMHG | OXYGEN SATURATION: 98 % | HEIGHT: 70 IN | HEART RATE: 98 BPM

## 2021-12-07 DIAGNOSIS — I10 BENIGN ESSENTIAL HYPERTENSION: ICD-10-CM

## 2021-12-07 DIAGNOSIS — Z87.891 PERSONAL HISTORY OF NICOTINE DEPENDENCE: ICD-10-CM

## 2021-12-07 DIAGNOSIS — Z12.2 ENCOUNTER FOR SCREENING FOR LUNG CANCER: ICD-10-CM

## 2021-12-07 DIAGNOSIS — Z12.12 SCREENING FOR COLORECTAL CANCER: ICD-10-CM

## 2021-12-07 DIAGNOSIS — E78.2 MIXED HYPERLIPIDEMIA: ICD-10-CM

## 2021-12-07 DIAGNOSIS — Z00.00 ANNUAL PHYSICAL EXAM: Primary | ICD-10-CM

## 2021-12-07 DIAGNOSIS — Z12.11 SCREENING FOR COLORECTAL CANCER: ICD-10-CM

## 2021-12-07 DIAGNOSIS — Z72.0 TOBACCO ABUSE: ICD-10-CM

## 2021-12-07 DIAGNOSIS — Z13.6 SCREENING FOR AAA (ABDOMINAL AORTIC ANEURYSM): ICD-10-CM

## 2021-12-07 PROCEDURE — 3008F BODY MASS INDEX DOCD: CPT | Performed by: NURSE PRACTITIONER

## 2021-12-07 PROCEDURE — 99396 PREV VISIT EST AGE 40-64: CPT | Performed by: NURSE PRACTITIONER

## 2021-12-07 RX ORDER — NICOTINE 21 MG/24HR
1 PATCH, TRANSDERMAL 24 HOURS TRANSDERMAL EVERY 24 HOURS
Qty: 28 PATCH | Refills: 0 | Status: SHIPPED | OUTPATIENT
Start: 2021-12-07

## 2022-02-10 DIAGNOSIS — F32.9 MDD (MAJOR DEPRESSIVE DISORDER): ICD-10-CM

## 2022-02-10 RX ORDER — VENLAFAXINE HYDROCHLORIDE 75 MG/1
CAPSULE, EXTENDED RELEASE ORAL
Qty: 90 CAPSULE | Refills: 0 | Status: SHIPPED | OUTPATIENT
Start: 2022-02-10 | End: 2022-05-19

## 2022-02-17 ENCOUNTER — OFFICE VISIT (OUTPATIENT)
Dept: PSYCHIATRY | Facility: CLINIC | Age: 52
End: 2022-02-17
Payer: COMMERCIAL

## 2022-02-17 DIAGNOSIS — F41.1 GAD (GENERALIZED ANXIETY DISORDER): ICD-10-CM

## 2022-02-17 DIAGNOSIS — F33.2 MDD (MAJOR DEPRESSIVE DISORDER), RECURRENT SEVERE, WITHOUT PSYCHOSIS (HCC): ICD-10-CM

## 2022-02-17 DIAGNOSIS — F32.2 CURRENT SEVERE EPISODE OF MAJOR DEPRESSIVE DISORDER WITHOUT PSYCHOTIC FEATURES WITHOUT PRIOR EPISODE (HCC): Primary | ICD-10-CM

## 2022-02-17 PROCEDURE — 99213 OFFICE O/P EST LOW 20 MIN: CPT | Performed by: PSYCHIATRY & NEUROLOGY

## 2022-02-17 PROCEDURE — 90833 PSYTX W PT W E/M 30 MIN: CPT | Performed by: PSYCHIATRY & NEUROLOGY

## 2022-02-17 RX ORDER — ALPRAZOLAM 0.5 MG/1
0.5 TABLET ORAL 3 TIMES DAILY PRN
Qty: 270 TABLET | Refills: 0 | Status: SHIPPED | OUTPATIENT
Start: 2022-02-17 | End: 2022-07-22 | Stop reason: SDUPTHER

## 2022-02-17 NOTE — PSYCH
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  He stated his focus now is to save money for a comfortable and early assisted  Review Of Systems:     Mood Anxiety and Depression   Behavior Normal    Thought Content Disturbing Thoughts, Feelings   General Emotional Problems and Decreased Functioning   Personality Normal   Other Psych Symptoms Normal   Constitutional Negative   ENT Negative   Cardiovascular Negative   Respiratory Negative   Gastrointestinal Negative   Genitourinary Negative   Musculoskeletal Negative   Integumentary Negative and As Noted in HPI   Neurological Negative   Endocrine Normal    Other Symptoms Normal              Laboratory Results: No results found for this or any previous visit      Substance Abuse History:  Social History     Substance and Sexual Activity   Drug Use No       Family Psychiatric History:   Family History   Problem Relation Age of Onset    Aneurysm Father         ABDOMINAL AORTA    Hypertension Father     Hypertension Brother     Hypertension Paternal Grandmother     Hypertension Paternal Grandfather     Other Paternal Grandfather         SUDDEN DEATH     Aneurysm Paternal Uncle         ABDOMINAL AORTA       The following portions of the patient's history were reviewed and updated as appropriate: allergies, current medications, past family history, past medical history, past social history, past surgical history and problem list     Social History     Socioeconomic History    Marital status: /Civil Union     Spouse name: Not on file    Number of children: 3    Years of education: Not on file    Highest education level: Not on file   Occupational History    Not on file   Tobacco Use    Smoking status: Current Every Day Smoker     Packs/day: 1 00     Years: 30 00     Pack years: 30 00     Types: Cigarettes    Smokeless tobacco: Never Used    Tobacco comment: 0 5-1 PPD  STARTED AT 14 YRS OLD    Substance and Sexual Activity    Alcohol use: Yes     Comment: OCCASIONALLY -- COUPLE DRINKS PER WEEK     Drug use: No    Sexual activity: Not on file   Other Topics Concern    Not on file   Social History Narrative    [address] [place] [address] 2003     Social Determinants of Health     Financial Resource Strain: Not on file   Food Insecurity: Not on file   Transportation Needs: Not on file   Physical Activity: Not on file   Stress: Not on file   Social Connections: Not on file   Intimate Partner Violence: Not on file   Housing Stability: Not on file     Social History     Social History Narrative    [address] [place] [address]       Objective:       Mental status:  Appearance calm and cooperative , adequate hygiene and grooming and good eye contact    Mood dysphoric   Affect affect was constricted   Speech a normal rate and fluent   Thought Processes coherent/organized and normal thought processes   Hallucinations no hallucinations present    Thought Content no delusions   Abnormal Thoughts no suicidal thoughts  and no homicidal thoughts    Orientation  oriented to person and place and time   Remote Memory short term memory intact and long term memory intact   Attention Span concentration intact   Intellect Appears to be of Average Intelligence   Insight Limited insight   Judgement judgment was limited   Muscle Strength Muscle strength and tone were normal and Normal gait    Language no difficulty naming common objects and no difficulty repeating a phrase    Fund of Knowledge displays adequate knowledge of current events               Assessment/Plan:       Diagnoses and all orders for this visit:    Current severe episode of major depressive disorder without psychotic features without prior episode ([facility] )    MDD (major depressive disorder), recurrent severe, without psychosis ([facility] )    SUNDEEP (generalized anxiety disorder)  -     ALPRAZolam (XANAX) 0 5 mg tablet; Take 1 tablet (0 5 mg total) by mouth 3 (three) times a day as needed for anxiety            Treatment Recommendations- Risks Benefits      Immediate Medical/Psychiatric/Psychotherapy Treatments and Any Precautions:continue current medications   Risks, Benefits And Possible Side Effects Of Medications:  {PSYCH RISK, BENEFITS AND POSSIBLE SIDE EFFECTS (Optional):27295    Controlled Medication Discussion: Discussed with patient Black Box warning on concurrent use of benzodiazepines and opioid medications including sedation, respiratory depression, coma and death  Patient understands the risk of treatment with benzodiazepines in addition to opioids and wants to continue taking those medications  , Discussed with patient the risks of sedation, respiratory depression, impairment of ability to drive and potential for abuse and addiction related to treatment with benzodiazepine medications  The patient understands risk of treatment with benzodiazepine medications, agrees to not drive if feels impaired and agrees to take medications as prescribed  and The patient has been filling controlled prescriptions on time as prescribed to [facility]       Psychotherapy Provided: Individual psychotherapy provided  Individual psychotherapy provided: Yes  Counseling was provided during the session today for 16 minutes    Medications, treatment progress and treatment plan reviewed with Mayra Whitehead  Medication education provided to Mayra Whitehead  Goals discussed during in session: continue improvement in depression  Recent stressor including COVID-19 issues, job stress, health issues, limited support, social difficulties and ongoing anxiety discussed with Mayra Whitehead  Coping strategies including compliance with medications, contacting a therapist, deep/slow breathing, eliminating avoidance, engaging in previously avoided activities, exercising, getting into a good routine, improving self-esteem, increasing energy, increasing interest in usual activities, increasing motivation, increasing social contact, maintain healthy diet, maintain heathy sleeping hygiene and maintain positive attitude reviewed with Mayra Whitehead  Importance of medication and treatment compliance reviewed with Mayra Whitehead  Educated on importance of medication and treatment compliance  Importance of follow up with family physician for medical issues reviewed with Mayra Whitehead  Supportive therapy provided

## 2022-02-17 NOTE — BH TREATMENT PLAN
TREATMENT PLAN (Medication Management Only)        [facility]    Name and Date of Birth:  Emma Heredia 46 y o  1970  Date of Treatment Plan: February 17, 2022  Diagnosis/Diagnoses:    1  Current severe episode of major depressive disorder without psychotic features without prior episode ([facility] )    2  MDD (major depressive disorder), recurrent severe, without psychosis ([facility] )    3  SUNDEEP (generalized anxiety disorder)      Strengths/Personal Resources for Self-Care: supportive family, taking medications as prescribed, ability to communicate needs  Area/Areas of need (in own words): depression, depressive symptoms  1  Long Term Goal: continue improvement in depression  Target Date:6 months - 8/20/2022  Person/Persons responsible for completion of goal: Maurizio Dalal  2  Short Term Objective (s) - How will we reach this goal?:   A  Provider new recommended medication/dosage changes and/or continue medication(s): continue current medications as prescribed  B  N/A   C  N/A  Target Date:6 months - 8/20/2022  Person/Persons Responsible for Completion of Goal: Maurizio Dalal  Progress Towards Goals: continuing treatment  Treatment Modality: medication management every 6 months  Review due 180 days from date of this plan: 6 months - 8/20/2022  Expected length of service: ongoing treatment  My Physician/PA/NP and I have developed this plan together and I agree to work on the goals and objectives  I understand the treatment goals that were developed for my treatment

## 2022-05-12 ENCOUNTER — VBI (OUTPATIENT)
Dept: ADMINISTRATIVE | Facility: OTHER | Age: 52
End: 2022-05-12

## 2022-05-19 DIAGNOSIS — F32.9 MDD (MAJOR DEPRESSIVE DISORDER): ICD-10-CM

## 2022-05-19 RX ORDER — VENLAFAXINE HYDROCHLORIDE 75 MG/1
CAPSULE, EXTENDED RELEASE ORAL
Qty: 90 CAPSULE | Refills: 0 | Status: SHIPPED | OUTPATIENT
Start: 2022-05-19

## 2022-07-22 DIAGNOSIS — F41.1 GAD (GENERALIZED ANXIETY DISORDER): ICD-10-CM

## 2022-07-22 RX ORDER — ALPRAZOLAM 0.5 MG/1
0.5 TABLET ORAL 3 TIMES DAILY PRN
Qty: 90 TABLET | Refills: 0 | Status: SHIPPED | OUTPATIENT
Start: 2022-07-22 | End: 2022-09-13 | Stop reason: SDUPTHER

## 2022-07-22 NOTE — TELEPHONE ENCOUNTER
PMDP and Chart reviewed  Refill was sent to the patient's preferred pharmacy, covering patient's primary psychiatrist, Dr Severa Mart

## 2022-08-18 ENCOUNTER — TELEPHONE (OUTPATIENT)
Dept: PSYCHIATRY | Facility: CLINIC | Age: 52
End: 2022-08-18

## 2022-08-25 DIAGNOSIS — F32.9 MDD (MAJOR DEPRESSIVE DISORDER): ICD-10-CM

## 2022-08-25 RX ORDER — VENLAFAXINE HYDROCHLORIDE 75 MG/1
CAPSULE, EXTENDED RELEASE ORAL
Qty: 90 CAPSULE | Refills: 0 | Status: SHIPPED | OUTPATIENT
Start: 2022-08-25

## 2022-08-31 DIAGNOSIS — I10 ESSENTIAL HYPERTENSION: ICD-10-CM

## 2022-08-31 RX ORDER — LOSARTAN POTASSIUM 50 MG/1
TABLET ORAL
Qty: 90 TABLET | Refills: 2 | Status: SHIPPED | OUTPATIENT
Start: 2022-08-31

## 2022-09-08 ENCOUNTER — VBI (OUTPATIENT)
Dept: ADMINISTRATIVE | Facility: OTHER | Age: 52
End: 2022-09-08

## 2022-09-13 DIAGNOSIS — F41.1 GAD (GENERALIZED ANXIETY DISORDER): ICD-10-CM

## 2022-09-13 RX ORDER — ALPRAZOLAM 0.5 MG/1
0.5 TABLET ORAL 3 TIMES DAILY PRN
Qty: 90 TABLET | Refills: 2 | Status: SHIPPED | OUTPATIENT
Start: 2022-09-13 | End: 2022-10-24 | Stop reason: SDUPTHER

## 2022-09-13 NOTE — TELEPHONE ENCOUNTER
Medication Refill Request     Name of Medication Xanax  Dose/Frequency 0 5 Mg 3x daily   Quantity 90  Verified pharmacy   [x]  Verified ordering Provider   [x]  Does patient have enough for the next 3 days? Yes [] No [x]  Does patient have a follow-up appointment scheduled?  Yes [x] No []   If so when is appointment: Please R/O to schedule F/U appoint from appt missed 08/17/2022

## 2022-09-30 ENCOUNTER — VBI (OUTPATIENT)
Dept: ADMINISTRATIVE | Facility: OTHER | Age: 52
End: 2022-09-30

## 2022-10-24 DIAGNOSIS — F41.1 GAD (GENERALIZED ANXIETY DISORDER): ICD-10-CM

## 2022-10-24 RX ORDER — ALPRAZOLAM 0.5 MG/1
0.5 TABLET ORAL 3 TIMES DAILY PRN
Qty: 90 TABLET | Refills: 2 | Status: SHIPPED | OUTPATIENT
Start: 2022-10-24 | End: 2022-11-23

## 2022-11-25 ENCOUNTER — OFFICE VISIT (OUTPATIENT)
Dept: PSYCHIATRY | Facility: CLINIC | Age: 52
End: 2022-11-25

## 2022-11-25 DIAGNOSIS — F32.9 MDD (MAJOR DEPRESSIVE DISORDER): ICD-10-CM

## 2022-11-25 DIAGNOSIS — F41.1 GAD (GENERALIZED ANXIETY DISORDER): ICD-10-CM

## 2022-11-25 DIAGNOSIS — F33.2 MDD (MAJOR DEPRESSIVE DISORDER), RECURRENT SEVERE, WITHOUT PSYCHOSIS (HCC): Primary | ICD-10-CM

## 2022-11-25 RX ORDER — VENLAFAXINE HYDROCHLORIDE 75 MG/1
CAPSULE, EXTENDED RELEASE ORAL
Qty: 90 CAPSULE | Refills: 0 | Status: SHIPPED | OUTPATIENT
Start: 2022-11-25

## 2022-11-25 NOTE — PSYCH
Visit Time    Visit Start Time: 10:00  Visit Stop Time: 10:30  Total Visit Duration: 30 minutes    Subjective: Medication Management      Patient ID: Ramu Arenas is a 45 y o  male with MDD and SUNDEEP    HPI ROS Appetite Changes and Sleep: normal appetite, normal energy level, no weight change and normal number of sleep hours   Te Richards remains compliant with his medications and denies side effects  He denies recent health changes or new medications  He stated his mood has remained stable  He reported increased stress at home after his father had recent ambulatory surgery and sustained a fall that complicated his recovery  This occurred last August and thankfully he has been recovering well  He wishes to continue current treatment as prescribed and he agrees to schedule follow up every 6 months or sooner if needed   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      Review Of Systems:     Mood Anxiety and Depression   Behavior Normal    Thought Content Normal   General Emotional Problems and Decreased Functioning   Personality Normal   Other Psych Symptoms Normal   Constitutional Negative   ENT Negative   Cardiovascular Negative   Respiratory Negative   Gastrointestinal Negative   Genitourinary Negative   Musculoskeletal Negative   Integumentary Negative   Neurological Negative   Endocrine Normal    Other Symptoms Normal        Laboratory Results:  Most Recent Labs:   Lab Results   Component Value Date    WBC 7 05 02/18/2016    RBC 5 33 02/18/2016    HGB 16 9 02/18/2016    HCT 48 0 02/18/2016     02/18/2016    RDW 12 7 02/18/2016    NEUTROABS 4 33 02/18/2016    K 4 2 04/14/2019     04/14/2019    CO2 26 04/14/2019    BUN 17 04/14/2019    CREATININE 0 93 04/14/2019    CALCIUM 8 8 04/14/2019    AST 13 04/14/2019    ALT 23 04/14/2019    ALKPHOS 97 04/14/2019    CHOL 270 (H) 05/14/2013    TRIG 535 (H) 05/14/2013    HDL 39 (L) 05/14/2013    NONHDLC 231 (H) 05/14/2013    RPR Non-Reactive 04/14/2019       Substance Abuse History:  Social History     Substance and Sexual Activity   Drug Use No       Family Psychiatric History:   Family History   Problem Relation Age of Onset   • Aneurysm Father         ABDOMINAL AORTA   • Hypertension Father    • Hypertension Brother    • Hypertension Paternal Grandmother    • Hypertension Paternal Grandfather    • Other Paternal Grandfather         SUDDEN DEATH    • Aneurysm Paternal Uncle         ABDOMINAL AORTA       The following portions of the patient's history were reviewed and updated as appropriate: allergies, current medications, past family history, past medical history, past social history, past surgical history and problem list     Social History     Socioeconomic History   • Marital status: /Civil Union     Spouse name: Not on file   • Number of children: 3   • Years of education: Not on file   • Highest education level: Not on file   Occupational History   • Not on file   Tobacco Use   • Smoking status: Every Day     Packs/day: 1 00     Years: 30 00     Pack years: 30 00     Types: Cigarettes   • Smokeless tobacco: Never   • Tobacco comments:     0 5-1 PPD  STARTED AT 14 YRS OLD    Substance and Sexual Activity   • Alcohol use: Yes     Comment: OCCASIONALLY -- COUPLE DRINKS PER WEEK    • Drug use: No   • Sexual activity: Not on file   Other Topics Concern   • Not on file   Social History Narrative    [address]     Social Determinants of Health     Financial Resource Strain: Not on file   Food Insecurity: Not on file   Transportation Needs: Not on file   Physical Activity: Not on file   Stress: Not on file   Social Connections: Not on file   Intimate Partner Violence: Not on file   Housing Stability: Not on file     Social History     Social History Narrative    [facility] [employer] [facility] [address]       Objective:       Mental status:  Appearance calm and cooperative , adequate hygiene and grooming and good eye contact    Mood dysphoric   Affect affect was constricted   Speech a normal rate and fluent   Thought Processes coherent/organized and normal thought processes   Hallucinations no hallucinations present    Thought Content no delusions   Abnormal Thoughts no suicidal thoughts  and no homicidal thoughts    Orientation  oriented to person and place and time   Remote Memory short term memory intact and long term memory intact   Attention Span concentration intact   Intellect Appears to be of Average Intelligence   Insight Limited insight   Judgement judgment was limited   Muscle Strength Muscle strength and tone were normal and Normal gait    Language no difficulty naming common objects and no difficulty repeating a phrase    Fund of Knowledge displays adequate knowledge of current events, adequate fund of knowledge regarding past history and adequate fund of knowledge regarding vocabulary                Assessment/Plan:       Diagnoses and all orders for this visit:    MDD (major depressive disorder), recurrent severe, without psychosis ([facility] )    SUNDEEP (generalized anxiety disorder)            Treatment Recommendations- Risks Benefits      Immediate Medical/Psychiatric/Psychotherapy Treatments and Any Precautions: continue current treatment   Risks, Benefits And Possible Side Effects Of Medications:  {PSYCH RISK, BENEFITS AND POSSIBLE SIDE EFFECTS (Optional):35770    Controlled Medication Discussion: Discussed with patient Black Box warning on concurrent use of benzodiazepines and opioid medications including sedation, respiratory depression, coma and death  Patient understands the risk of treatment with benzodiazepines in addition to opioids and wants to continue taking those medications   , Discussed with patient the risks of sedation, respiratory depression, impairment of ability to drive and potential for abuse and addiction related to treatment with benzodiazepine medications  The patient understands risk of treatment with benzodiazepine medications, agrees to not drive if feels impaired and agrees to take medications as prescribed  and The patient has been filling controlled prescriptions on time as prescribed to [facility]       Psychotherapy Provided: Individual psychotherapy provided  Individual psychotherapy provided: Yes  Counseling was provided during the session today for 16 minutes  Medications, treatment progress and treatment plan reviewed with Anthony  Medication education provided to Anthony  Goals discussed during in session: continue improvement in depression  Recent stressor including COVID-19 issues, job stress, health issues, limited support, social difficulties and ongoing anxiety discussed with Pooja Michaels  Coping strategies including compliance with medications, contacting a therapist, deep/slow breathing, eliminating avoidance, engaging in previously avoided activities, exercising, getting into a good routine, improving self-esteem, increasing energy, increasing interest in usual activities, increasing motivation, increasing social contact, maintain healthy diet, maintain heathy sleeping hygiene and maintain positive attitude reviewed with Pooja Michaels  Importance of medication and treatment compliance reviewed with Anthony  Educated on importance of medication and treatment compliance  Importance of follow up with family physician for medical issues reviewed with Anthony  Supportive therapy provided

## 2022-11-25 NOTE — BH TREATMENT PLAN
TREATMENT PLAN (Medication Management Only)        [facility]    Name and Date of Birth:  Christine Treadwell 45 y o  1970  Date of Treatment Plan: November 25, 2022  Diagnosis/Diagnoses:    1  MDD (major depressive disorder), recurrent severe, without psychosis ([facility] )    2  SUNDEEP (generalized anxiety disorder)      Strengths/Personal Resources for Self-Care: taking medications as prescribed, ability to communicate needs  Area/Areas of need (in own words): anxiety, anxiety symptoms, depression, depressive symptoms  1  Long Term Goal: continue improvement in depression  Target Date:6 months - 5/25/2023  Person/Persons responsible for completion of goal: Harman Wallace  2  Short Term Objective (s) - How will we reach this goal?:   A  Provider new recommended medication/dosage changes and/or continue medication(s): continue current medications as prescribed  B  N/A   C  N/A  Target Date:6 months - 5/25/2023  Person/Persons Responsible for Completion of Goal: Harman Wallace  Progress Towards Goals: continuing treatment  Treatment Modality: medication management every 6 months  Review due 180 days from date of this plan: 6 months - 5/25/2023  Expected length of service: ongoing treatment  My Physician/PA/NP and I have developed this plan together and I agree to work on the goals and objectives  I understand the treatment goals that were developed for my treatment

## 2022-12-01 ENCOUNTER — RA CDI HCC (OUTPATIENT)
Dept: OTHER | Facility: HOSPITAL | Age: 52
End: 2022-12-01

## 2022-12-01 NOTE — PROGRESS NOTES
[facility]  coding opportunities       Chart reviewed, no opportunity found: CHART REVIEWED, NO OPPORTUNITY FOUND        Patients Insurance        Commercial Insurance: [address] [place] [address]

## 2022-12-08 ENCOUNTER — OFFICE VISIT (OUTPATIENT)
Dept: INTERNAL MEDICINE CLINIC | Facility: CLINIC | Age: 52
End: 2022-12-08

## 2022-12-08 VITALS
WEIGHT: 187 LBS | SYSTOLIC BLOOD PRESSURE: 136 MMHG | BODY MASS INDEX: 26.77 KG/M2 | HEIGHT: 70 IN | DIASTOLIC BLOOD PRESSURE: 80 MMHG | OXYGEN SATURATION: 98 % | HEART RATE: 70 BPM

## 2022-12-08 DIAGNOSIS — H02.826 SEBACEOUS CYSTS OF EYELIDS OF BOTH EYES: ICD-10-CM

## 2022-12-08 DIAGNOSIS — H02.823 SEBACEOUS CYSTS OF EYELIDS OF BOTH EYES: ICD-10-CM

## 2022-12-08 DIAGNOSIS — Z00.00 ANNUAL PHYSICAL EXAM: ICD-10-CM

## 2022-12-08 DIAGNOSIS — L72.3 SEBACEOUS CYST: ICD-10-CM

## 2022-12-08 DIAGNOSIS — Z12.11 ENCOUNTER FOR SCREENING COLONOSCOPY: Primary | ICD-10-CM

## 2022-12-08 DIAGNOSIS — I10 ESSENTIAL HYPERTENSION: ICD-10-CM

## 2022-12-08 DIAGNOSIS — Z12.2 ENCOUNTER FOR SCREENING FOR LUNG CANCER: ICD-10-CM

## 2022-12-08 DIAGNOSIS — Z13.6 SCREENING FOR AAA (ABDOMINAL AORTIC ANEURYSM): ICD-10-CM

## 2022-12-08 DIAGNOSIS — Z23 IMMUNIZATION DUE: ICD-10-CM

## 2022-12-08 DIAGNOSIS — Z12.5 SCREENING FOR PROSTATE CANCER: ICD-10-CM

## 2022-12-08 DIAGNOSIS — F17.210 SMOKING GREATER THAN 20 PACK YEARS: ICD-10-CM

## 2022-12-08 NOTE — PROGRESS NOTES
Assessment/Plan:    Annual physical exam  Overall patient is doing well  Walks daily with his dog between 1/2 mile to one mile  Recommended to follow a balanced diet, incorporating more fruits and vegetables, avoid sweets, sodas and unhealthy snacks  reviewed screenings and immunizations  Patient declined covid boosters, flu and pneumococcal but aggred with shingrex vaccine  Also agreed with colonoscopy and PSA screening  Has extensive history of AAA at young age in his family also he is a smoker, will order AAA screening  We will be ordering screening labs that are age appropriate   Encouraged smoking cessation, patient is considering it ( he tried before but restarted keyon covid hit)  Advised him to let us know if he needs help     Sebaceous cysts  Since this summer, under the right eye, disturbing as he wears glasses  Will refer to dermatology      Essential hypertension  /80 mmHg in the office today   Reported at home BP in better  Continue Losartan          Diagnoses and all orders for this visit:    Encounter for screening colonoscopy  -     Ambulatory referral for colonoscopy; Future    Encounter for screening for lung cancer  -     CT lung screening program; Future    Smoking greater than 20 pack years  -     CT lung screening program; Future    Screening for AAA (abdominal aortic aneurysm)  -     US abdominal aorta screening aaa; Future    Annual physical exam  -     Comprehensive metabolic panel; Future  -     CBC and differential; Future  -     Lipid Panel with Direct LDL reflex; Future  -     Hemoglobin A1C; Future    Sebaceous cyst  -     Ambulatory Referral to Dermatology; Future    Immunization due  -     Zoster Vaccine Recombinant IM    Screening for prostate cancer  -     PSA, Total Screen;  Future    Sebaceous cysts    Essential hypertension          Subjective:      Patient ID: Amalia Lala is a 45 y o  male, came in the office for annual physical  Reported a small tumor under the right eye since summer, that is disturbing as he wears glasses  BP at home better than in the office, per patient     HPI    The following portions of the patient's history were reviewed and updated as appropriate: allergies, current medications, past family history, past medical history, past social history, past surgical history and problem list     Review of Systems   Constitutional: Negative for unexpected weight change  Respiratory: Negative for chest tightness and shortness of breath  Cardiovascular: Negative for chest pain and leg swelling  Gastrointestinal: Negative for constipation and diarrhea  Genitourinary: Negative for decreased urine volume, difficulty urinating, frequency and hematuria  Psychiatric/Behavioral: Negative for self-injury and suicidal ideas  The patient is not nervous/anxious  All other systems reviewed and are negative  Objective:      /80   Pulse 70   Ht 5' 10" (1 778 m)   Wt 84 8 kg (187 lb)   SpO2 98%   BMI 26 83 kg/m²         Physical Exam  Vitals reviewed  Constitutional:       General: He is not in acute distress  Appearance: He is not diaphoretic  Cardiovascular:      Rate and Rhythm: Normal rate and regular rhythm  Pulmonary:      Effort: No respiratory distress  Breath sounds: Normal breath sounds  No wheezing or rales  Abdominal:      General: There is no distension  Palpations: Abdomen is soft  Tenderness: There is no abdominal tenderness  There is no guarding  Musculoskeletal:      Right lower leg: No edema  Left lower leg: No edema  Neurological:      Mental Status: He is alert  Psychiatric:         Mood and Affect: Mood normal          Behavior: Behavior normal          Thought Content:  Thought content normal          Judgment: Judgment normal

## 2022-12-08 NOTE — ASSESSMENT & PLAN NOTE
Overall patient is doing well  Walks daily with his dog between 1/2 mile to one mile  Recommended to follow a balanced diet, incorporating more fruits and vegetables, avoid sweets, sodas and unhealthy snacks  reviewed screenings and immunizations  Patient declined covid boosters, flu and pneumococcal but aggred with shingrex vaccine  Also agreed with colonoscopy and PSA screening  Has extensive history of AAA at young age in his family also he is a smoker, will order AAA screening  We will be ordering screening labs that are age appropriate   Encouraged smoking cessation, patient is considering it ( he tried before but restarted keyon covid hit)   Advised him to let us know if he needs help Spinal    Diagnosis: C section for failure to progress  Patient location during procedure: OB  Start time: 6/16/2022 7:00 AM  Timeout: 6/16/2022 6:59 AM  End time: 6/16/2022 7:01 AM    Staffing  Authorizing Provider: Nhan Phillip CRNA  Performing Provider: Nhan Phillip, KATTY    Staffing  Other anesthesia staff: Claude Cortes MD  Preanesthetic Checklist  Completed: patient identified, IV checked, site marked, risks and benefits discussed, surgical consent, monitors and equipment checked, pre-op evaluation and timeout performed  Spinal Block  Patient position: sitting  Prep: ChloraPrep  Patient monitoring: heart rate, cardiac monitor and continuous pulse ox  Approach: midline  Location: L4-5  Injection technique: single shot  CSF Fluid: clear free-flowing CSF  Needle  Needle type: pencil-tip   Needle gauge: 25 G  Needle length: 5 in  Additional Documentation: incremental injection and negative aspiration for heme  Needle localization: anatomical landmarks  Assessment  Sensory level: T5   Dermatomal levels determined by pinch or prick  Ease of block: easy  Patient's tolerance of the procedure: comfortable throughout block  Medications:    Medications: bupivacaine (pf) (MARCAINE) injection 0.75% - Intraspinal   1.6 mL - 6/16/2022 7:00:00 AM

## 2022-12-08 NOTE — PROGRESS NOTES
[facility]    NAME: Alanna Vera  AGE: 45 y o  SEX: male  : 1970     DATE: 2022     Assessment and Plan:     Problem List Items Addressed This Visit        Cardiovascular and Mediastinum    Essential hypertension     /80 mmHg in the office today   Reported at home BP in better  Continue Losartan               Musculoskeletal and Integument    Sebaceous cysts     Since this summer, under the right eye, disturbing as he wears glasses  Will refer to dermatology              Other    Annual physical exam     Overall patient is doing well  Walks daily with his dog between 1/2 mile to one mile  Recommended to follow a balanced diet, incorporating more fruits and vegetables, avoid sweets, sodas and unhealthy snacks  reviewed screenings and immunizations  Patient declined covid boosters, flu and pneumococcal but aggred with shingrex vaccine  Also agreed with colonoscopy and PSA screening  Has extensive history of AAA at young age in his family also he is a smoker, will order AAA screening  We will be ordering screening labs that are age appropriate   Encouraged smoking cessation, patient is considering it ( he tried before but restarted keyon covid hit)   Advised him to let us know if he needs help          Relevant Orders    Comprehensive metabolic panel    CBC and differential    Lipid Panel with Direct LDL reflex    Hemoglobin A1C   Other Visit Diagnoses     Encounter for screening colonoscopy    -  Primary    Relevant Orders    Ambulatory referral for colonoscopy    Encounter for screening for lung cancer        Relevant Orders    CT lung screening program    Smoking greater than 20 pack years        Relevant Orders    CT lung screening program    Screening for AAA (abdominal aortic aneurysm)        Relevant Orders    US abdominal aorta screening aaa    Sebaceous cyst        Relevant Orders    Ambulatory Referral to Dermatology    Immunization due        Relevant Orders    Zoster Vaccine Recombinant IM    Screening for prostate cancer        Relevant Orders    PSA, Total Screen          Immunizations and preventive care screenings were discussed with patient today  Appropriate education was printed on patient's after visit summary  Discussed risks and benefits of prostate cancer screening  We discussed the controversial history of PSA screening for prostate cancer in the [place] as well as the risk of over detection and over treatment of prostate cancer by way of PSA screening  The patient understands that PSA blood testing is an imperfect way to screen for prostate cancer and that elevated PSA levels in the blood may also be caused by infection, inflammation, prostatic trauma or manipulation, urological procedures, or by benign prostatic enlargement  The role of the digital rectal examination in prostate cancer screening was also discussed and I discussed with him that there is large interobserver variability in the findings of digital rectal examination  Counseling:  Alcohol/drug use: discussed moderation in alcohol intake, the recommendations for healthy alcohol use, and avoidance of illicit drug use  Dental Health: discussed importance of regular tooth brushing, flossing, and dental visits  Injury prevention: discussed safety/seat belts, safety helmets, smoke detectors, carbon dioxide detectors, and smoking near bedding or upholstery  · Exercise: the importance of regular exercise/physical activity was discussed  Recommend exercise 3-5 times per week for at least 30 minutes  BMI Counseling: Body mass index is 26 83 kg/m²  The BMI is above normal  Nutrition recommendations include encouraging healthy choices of fruits and vegetables, consuming healthier snacks, limiting drinks that contain sugar and reducing intake of cholesterol  Exercise recommendations include exercising 3-5 times per week  Rationale for BMI follow-up plan is due to patient being overweight or obese  Return in about 1 year (around 2023) for Annual physical      Chief Complaint:     Chief Complaint   Patient presents with   • Annual Exam      History of Present Illness:     Adult Annual Physical   Patient here for a comprehensive physical exam  The patient reports problems - small sebaceous cyst under the right eye   Diet and Physical Activity  · Diet/Nutrition: well balanced diet  · Exercise: walking  Depression Screening  PHQ-2/9 Depression Screening    Little interest or pleasure in doing things: 0 - not at all  Feeling down, depressed, or hopeless: 0 - not at all  Trouble falling or staying asleep, or sleeping too much: 1 - several days  Feeling tired or having little energy: 1 - several days  Poor appetite or overeatin - not at all  Feeling bad about yourself - or that you are a failure or have let yourself or your family down: 0 - not at all  Trouble concentrating on things, such as reading the newspaper or watching television: 0 - not at all  Moving or speaking so slowly that other people could have noticed  Or the opposite - being so fidgety or restless that you have been moving around a lot more than usual: 0 - not at all  Thoughts that you would be better off dead, or of hurting yourself in some way: 0 - not at all  PHQ-9 Score: 2   PHQ-9 Interpretation: No or Minimal depression        General Health  · Sleep: sleeps well  · Hearing: normal - bilateral   · Vision: goes for regular eye exams  · Dental: regular dental visits   Health  · Symptoms include: none     Review of Systems:     Review of Systems   Constitutional: Negative for unexpected weight change  Respiratory: Negative for chest tightness and shortness of breath  Cardiovascular: Negative for chest pain and leg swelling  Gastrointestinal: Negative for constipation and diarrhea     Genitourinary: Negative for difficulty urinating, frequency and hematuria  Psychiatric/Behavioral: Negative for self-injury and suicidal ideas  The patient is not nervous/anxious  All other systems reviewed and are negative  Past Medical History:     Past Medical History:   Diagnosis Date   • Elevated blood pressure reading     LAST ASSESSED [id number]   • Gout     LAST ASSESSED [id number]   • Hypertension    • Social anxiety disorder     LAST ASSESSED [id number]      Past Surgical History:     History reviewed  No pertinent surgical history     Family History:     Family History   Problem Relation Age of Onset   • Aneurysm Father         ABDOMINAL AORTA   • Hypertension Father    • Hypertension Brother    • Hypertension Paternal Grandmother    • Hypertension Paternal Grandfather    • Other Paternal Grandfather         SUDDEN DEATH    • Aneurysm Paternal Uncle         ABDOMINAL AORTA      Social History:     Social History     Socioeconomic History   • Marital status: /Civil Union     Spouse name: None   • Number of children: 3   • Years of education: None   • Highest education level: None   Occupational History   • None   Tobacco Use   • Smoking status: Every Day     Packs/day: 1 00     Years: 30 00     Pack years: 30 00     Types: Cigarettes   • Smokeless tobacco: Never   • Tobacco comments:     0 5-1 PPD  STARTED AT 14 YRS OLD    Substance and Sexual Activity   • Alcohol use: Yes     Comment: OCCASIONALLY -- COUPLE DRINKS PER WEEK    • Drug use: No   • Sexual activity: None   Other Topics Concern   • None   Social History Narrative    [address] [place] [address]     Social Determinants of Health     Financial Resource Strain: Not on file   Food Insecurity: Not on file   Transportation Needs: Not on file   Physical Activity: Not on file   Stress: Not on file   Social Connections: Not on file   Intimate Partner Violence: Not on file   Housing Stability: Not on file      Current Medications:     Current Outpatient Medications   Medication Sig Dispense Refill   • losartan (COZAAR) 50 mg tablet TAKE 1 TABLET BY MOUTH EVERY DAY 90 tablet 2   • venlafaxine (EFFEXOR-XR) 75 mg 24 hr capsule TAKE 1 CAPSULE BY MOUTH EVERY DAY 90 capsule 0   • ALPRAZolam (XANAX) 0 5 mg tablet Take 1 tablet (0 5 mg total) by mouth 3 (three) times a day as needed for anxiety 90 tablet 2     No current facility-administered medications for this visit  Allergies: Allergies   Allergen Reactions   • Chlorthalidone      [facility] - [id number]: GOUT      Physical Exam:     /80   Pulse 70   Ht 5' 10" (1 778 m)   Wt 84 8 kg (187 lb)   SpO2 98%   BMI 26 83 kg/m²     Physical Exam  Vitals reviewed  Constitutional:       General: He is not in acute distress  Appearance: He is not diaphoretic  HENT:      Head: Normocephalic  Eyes:      General: No scleral icterus  Right eye: No discharge  Left eye: No discharge  Cardiovascular:      Rate and Rhythm: Normal rate and regular rhythm  Heart sounds: No murmur heard  Pulmonary:      Effort: No respiratory distress  Breath sounds: Normal breath sounds  No wheezing, rhonchi or rales  Abdominal:      General: There is no distension  Palpations: Abdomen is soft  Tenderness: There is no abdominal tenderness  There is no guarding or rebound  Musculoskeletal:      Right lower leg: No edema  Left lower leg: No edema  Skin:     General: Skin is warm  Neurological:      Mental Status: He is alert  Psychiatric:         Mood and Affect: Mood normal          Behavior: Behavior normal          Thought Content:  Thought content normal          Judgment: Judgment normal           Nutrition Assessment and Intervention:     Reviewed food recall journal      Physical Activity Assessment and Intervention:    Activity journal reviewed      Other interventions: Walking daily with dog, 1/2- 1 mile a day     Emotional and Mental Well-being, Sleep, Connectedness Assessment and Intervention:    Sleep/stress assessment performed    Depression and anxiety screening performed and reviewed      Tobacco and Toxic Substance Assessment and Intervention:     Tobacco use screening performed    Alcohol and drug use screening performed    Tobacco cessation counseling provided      Other interventions: ocasional alcohol, smoking 1 pa a day       Norm Lemons MD  [facility]34

## 2022-12-11 ENCOUNTER — VBI (OUTPATIENT)
Dept: ADMINISTRATIVE | Facility: OTHER | Age: 52
End: 2022-12-11

## 2023-02-23 DIAGNOSIS — F32.9 MDD (MAJOR DEPRESSIVE DISORDER): ICD-10-CM

## 2023-02-23 RX ORDER — VENLAFAXINE HYDROCHLORIDE 75 MG/1
CAPSULE, EXTENDED RELEASE ORAL
Qty: 90 CAPSULE | Refills: 0 | Status: SHIPPED | OUTPATIENT
Start: 2023-02-23

## 2023-03-01 ENCOUNTER — RA CDI HCC (OUTPATIENT)
Dept: OTHER | Facility: HOSPITAL | Age: 53
End: 2023-03-01

## 2023-03-01 NOTE — PROGRESS NOTES
[facility]  coding opportunities       Chart reviewed, no opportunity found: CHART REVIEWED, NO OPPORTUNITY FOUND        Patients Insurance        Commercial Insurance: [employer]

## 2023-03-06 ENCOUNTER — OFFICE VISIT (OUTPATIENT)
Dept: PSYCHIATRY | Facility: CLINIC | Age: 53
End: 2023-03-06

## 2023-03-06 DIAGNOSIS — F33.2 SEVERE EPISODE OF RECURRENT MAJOR DEPRESSIVE DISORDER, WITHOUT PSYCHOTIC FEATURES (HCC): Primary | ICD-10-CM

## 2023-03-06 DIAGNOSIS — F41.1 GAD (GENERALIZED ANXIETY DISORDER): ICD-10-CM

## 2023-03-06 DIAGNOSIS — F33.2 MDD (MAJOR DEPRESSIVE DISORDER), RECURRENT SEVERE, WITHOUT PSYCHOSIS (HCC): ICD-10-CM

## 2023-03-06 RX ORDER — ALPRAZOLAM 0.5 MG/1
0.5 TABLET ORAL 3 TIMES DAILY PRN
Qty: 90 TABLET | Refills: 2 | Status: SHIPPED | OUTPATIENT
Start: 2023-03-06 | End: 2023-04-05

## 2023-03-06 NOTE — PSYCH
Visit Time    Visit Start Time: 1:30   Visit Stop Time: 2:00  Total Visit Duration: 30 minutes    Subjective:Medications      Patient ID: Tresa Kim is a 52 y o  male with MDD    HPI ROS Appetite Changes and Sleep: normal appetite, normal energy level, no weight change and normal number of sleep hours   Sandra Qualia with his medications and denies side effects  He denies recent health changes or new medications  He stated his mood has remained stable  He reported increased stress after his father passed last 2023, he had been in hospice care only couple days  He stated now both of his parents are   He stated 3 days after the  he had COVID but it has been 5 weeks and he feels  fully recovered   He wishes to continue current treatment as prescribed and he agrees to schedule follow up every 6 months or sooner if needed  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  His 33 yo daughter on due this month with her first child and his twin boys are graduating college in May  Review Of Systems:     Mood Anxiety and Depression   Behavior Normal    Thought Content Disturbing Thoughts, Feelings   General Emotional Problems and Decreased Functioning   Personality Normal   Other Psych Symptoms Normal   Constitutional Negative   ENT Negative   Cardiovascular Negative   Respiratory Negative   Gastrointestinal Negative   Genitourinary Negative   Musculoskeletal Negative   Integumentary Negative   Neurological Negative   Endocrine Normal    Other Symptoms Normal        Laboratory Results:   Recent Labs (last 12 months):   No visits with results within 12 Month(s) from this visit     Latest known visit with results is:   Appointment on 2019   Component Date Value   • Sodium 2019 135 (L)    • Potassium 2019 4 2    • Chloride 2019 106    • CO2 2019 26    • ANION GAP 2019 3 (L)    • BUN 2019 17    • Creatinine 2019 0 93    • Glucose 04/14/2019 100    • Calcium 04/14/2019 8 8    • AST 04/14/2019 13    • ALT 04/14/2019 23    • Alkaline Phosphatase 04/14/2019 97    • Total Protein 04/14/2019 7 4    • Albumin 04/14/2019 3 9    • Total Bilirubin 04/14/2019 0 40    • eGFR 04/14/2019 95    • HIV-1/HIV-2 Ab 04/14/2019 Non-Reactive    • RPR 04/14/2019 Non-Reactive    • Hepatitis B Surface Ag 04/14/2019 Non-reactive    • Hep A IgM 04/14/2019 Non-reactive    • Hepatitis C Ab 04/14/2019 Non-reactive    • Hep B C IgM 04/14/2019 Non-reactive    • N gonorrhoeae, DNA Probe 04/14/2019 Negative    • Chlamydia trachomatis, D* 04/14/2019 Negative        Substance Abuse History:  Social History     Substance and Sexual Activity   Drug Use No       Family Psychiatric History:   Family History   Problem Relation Age of Onset   • Aneurysm Father         ABDOMINAL AORTA   • Hypertension Father    • Hypertension Brother    • Hypertension Paternal Grandmother    • Hypertension Paternal Grandfather    • Other Paternal Grandfather         SUDDEN DEATH    • Aneurysm Paternal Uncle         ABDOMINAL AORTA       The following portions of the patient's history were reviewed and updated as appropriate: allergies, current medications, past family history, past medical history, past social history, past surgical history and problem list     Social History     Socioeconomic History   • Marital status: /Civil Union     Spouse name: Not on file   • Number of children: 3   • Years of education: Not on file   • Highest education level: Not on file   Occupational History   • Not on file   Tobacco Use   • Smoking status: Every Day     Packs/day: 1 00     Years: 30 00     Pack years: 30 00     Types: Cigarettes   • Smokeless tobacco: Never   • Tobacco comments:     0 5-1 PPD  STARTED AT 14 YRS OLD    Substance and Sexual Activity   • Alcohol use: Yes     Comment: OCCASIONALLY -- COUPLE DRINKS PER WEEK    • Drug use: No   • Sexual activity: Not on file   Other Topics Concern   • Not on file   Social History Narrative    [facility] 2003     Social Determinants of Health     Financial Resource Strain: Not on file   Food Insecurity: Not on file   Transportation Needs: Not on file   Physical Activity: Not on file   Stress: Not on file   Social Connections: Not on file   Intimate Partner Violence: Not on file   Housing Stability: Not on file     Social History     Social History Narrative    [address] [facility]       Objective:       Mental status:  Appearance calm and cooperative , adequate hygiene and grooming and good eye contact    Mood dysphoric   Affect affect was constricted   Speech a normal rate and fluent   Thought Processes coherent/organized and normal thought processes   Hallucinations no hallucinations present    Thought Content no delusions   Abnormal Thoughts no suicidal thoughts  and no homicidal thoughts    Orientation  oriented to person and place and time   Remote Memory short term memory intact and long term memory intact   Attention Span concentration intact   Intellect Appears to be of Average Intelligence   Insight Limited insight   Judgement judgment was limited   Muscle Strength Muscle strength and tone were normal and Normal gait    Language no difficulty naming common objects and no difficulty repeating a phrase    Fund of Knowledge displays adequate knowledge of current events, adequate fund of knowledge regarding past history and adequate fund of knowledge regarding vocabulary      Assessment/Plan:       Diagnoses and all orders for this visit:    Severe episode of recurrent major depressive disorder, without psychotic features ([facility] )    SUNDEEP (generalized anxiety disorder)  -     ALPRAZolam (XANAX) 0 5 mg tablet;  Take 1 tablet (0 5 mg total) by mouth 3 (three) times a day as needed for anxiety    MDD (major depressive disorder), recurrent severe, without psychosis [facility])            Treatment Recommendations- Risks Benefits      Immediate Medical/Psychiatric/Psychotherapy Treatments and Any Precautions: continue current treatment     Risks, Benefits And Possible Side Effects Of Medications:  {PSYCH RISK, BENEFITS AND POSSIBLE SIDE EFFECTS (Optional):86567    Controlled Medication Discussion: Discussed with patient Black Box warning on concurrent use of benzodiazepines and opioid medications including sedation, respiratory depression, coma and death  Patient understands the risk of treatment with benzodiazepines in addition to opioids and wants to continue taking those medications  , Discussed with patient the risks of sedation, respiratory depression, impairment of ability to drive and potential for abuse and addiction related to treatment with benzodiazepine medications  The patient understands risk of treatment with benzodiazepine medications, agrees to not drive if feels impaired and agrees to take medications as prescribed  and The patient has been filling controlled prescriptions on time as prescribed to [facility] program       Psychotherapy Provided: Individual psychotherapy provided  Individual psychotherapy provided: Yes  Counseling was provided during the session today for 16 minutes  Medications, treatment progress and treatment plan reviewed with Anthony  Medication education provided to Anthony  Goals discussed during in session: continue improvement in depression  Recent stressor including COVID-19 issues, job stress, health issues, limited support, social difficulties and ongoing anxiety discussed with Gulshan Philippe     Coping strategies including compliance with medications, contacting a therapist, deep/slow breathing, eliminating avoidance, engaging in previously avoided activities, exercising, getting into a good routine, improving self-esteem, increasing energy, increasing interest in usual activities, increasing motivation, increasing social contact, maintain healthy diet, maintain heathy sleeping hygiene and maintain positive attitude reviewed with Zabrina Tiwari  Importance of medication and treatment compliance reviewed with Anthony  Educated on importance of medication and treatment compliance  Importance of follow up with family physician for medical issues reviewed with Anthony    Supportive therapy provided

## 2023-03-22 ENCOUNTER — VBI (OUTPATIENT)
Dept: ADMINISTRATIVE | Facility: OTHER | Age: 53
End: 2023-03-22

## 2023-05-24 ENCOUNTER — VBI (OUTPATIENT)
Dept: ADMINISTRATIVE | Facility: OTHER | Age: 53
End: 2023-05-24

## 2023-05-24 DIAGNOSIS — F32.9 MDD (MAJOR DEPRESSIVE DISORDER): ICD-10-CM

## 2023-05-24 RX ORDER — VENLAFAXINE HYDROCHLORIDE 75 MG/1
CAPSULE, EXTENDED RELEASE ORAL
Qty: 90 CAPSULE | Refills: 0 | Status: SHIPPED | OUTPATIENT
Start: 2023-05-24

## 2023-06-01 DIAGNOSIS — I10 ESSENTIAL HYPERTENSION: ICD-10-CM

## 2023-06-01 RX ORDER — LOSARTAN POTASSIUM 50 MG/1
TABLET ORAL
Qty: 90 TABLET | Refills: 2 | Status: SHIPPED | OUTPATIENT
Start: 2023-06-01

## 2023-06-07 ENCOUNTER — OFFICE VISIT (OUTPATIENT)
Dept: PSYCHIATRY | Facility: CLINIC | Age: 53
End: 2023-06-07

## 2023-06-07 DIAGNOSIS — Z12.11 SCREENING FOR COLON CANCER: ICD-10-CM

## 2023-06-07 DIAGNOSIS — F32.2 CURRENT SEVERE EPISODE OF MAJOR DEPRESSIVE DISORDER WITHOUT PSYCHOTIC FEATURES, UNSPECIFIED WHETHER RECURRENT (HCC): Primary | ICD-10-CM

## 2023-06-07 DIAGNOSIS — F41.1 GAD (GENERALIZED ANXIETY DISORDER): ICD-10-CM

## 2023-06-07 NOTE — BH TREATMENT PLAN
TREATMENT PLAN (Medication Management Only)        [facility]    Name and Date of Birth:  Paul Nix 52 y o  1970  Date of Treatment Plan: June 7, 2023  Diagnosis/Diagnoses:    1  Current severe episode of major depressive disorder without psychotic features, unspecified whether recurrent ([facility] )    2  Screening for colon cancer    3  SUNDEEP (generalized anxiety disorder)      Strengths/Personal Resources for Self-Care: supportive family, taking medications as prescribed, ability to communicate needs  Area/Areas of need (in own words): anxiety, anxiety symptoms, depression, depressive symptoms  1  Long Term Goal: continue improvement in depression  Target Date:6 months - 12/7/2023  Person/Persons responsible for completion of goal: [place]  2  Short Term Objective (s) - How will we reach this goal?:   A  Provider new recommended medication/dosage changes and/or continue medication(s): continue current medications as prescribed  B  N/A   C  N/A  Target Date:6 months - 12/7/2023  Person/Persons Responsible for Completion of Goal: [place]  Progress Towards Goals: continuing treatment  Treatment Modality: medication management every 6 months  Review due 180 days from date of this plan: 6 months - 12/7/2023  Expected length of service: ongoing treatment  My Physician/PA/NP and I have developed this plan together and I agree to work on the goals and objectives  I understand the treatment goals that were developed for my treatment

## 2023-06-07 NOTE — PSYCH
Visit Time    Visit Start Time: 9:30  Visit Stop Time: 10:00  Total Visit Duration: 30 minutes    Subjective: Medication Management      Patient ID: Nish Carrillo is a 52 y o  male with MDD and SUNDEEP    HPI ROS Appetite Changes and Sleep: normal appetite, normal energy level, no weight change and normal number of sleep hours   Anthony remains compliant with his medications and denies side effects  He denies recent health changes or new medications  He stated his mood has remained stable    He wishes to continue current treatment as prescribed and he agrees to schedule follow up every 6 months or sooner if needed  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  His 35 yo daughter had healthy baby boy and his twin sons graduated HS  He stated he plans to move closer to his  daughter in the near future  Mood Anxiety and Depression   Behavior Normal    Thought Content Disturbing Thoughts, Feelings   General Emotional Problems and Decreased Functioning   Personality Normal   Other Psych Symptoms Normal   Constitutional Negative   ENT Negative   Cardiovascular Negative   Respiratory Negative   Gastrointestinal Negative   Genitourinary Negative   Musculoskeletal Negative   Integumentary Negative   Neurological Negative   Endocrine Normal    Other Symptoms Normal        Laboratory Results:   Recent Labs (last 12 months):   No visits with results within 12 Month(s) from this visit     Latest known visit with results is:   Appointment on 04/14/2019   Component Date Value   • Sodium 04/14/2019 135 (L)    • Potassium 04/14/2019 4 2    • Chloride 04/14/2019 106    • CO2 04/14/2019 26    • ANION GAP 04/14/2019 3 (L)    • BUN 04/14/2019 17    • Creatinine 04/14/2019 0 93    • Glucose 04/14/2019 100    • Calcium 04/14/2019 8 8    • AST 04/14/2019 13    • ALT 04/14/2019 23    • Alkaline Phosphatase 04/14/2019 97    • Total Protein 04/14/2019 7 4    • Albumin 04/14/2019 3 9    • Total Bilirubin 04/14/2019 0 40    • eGFR 04/14/2019 95    • HIV-1/HIV-2 Ab 04/14/2019 Non-Reactive    • RPR 04/14/2019 Non-Reactive    • Hepatitis B Surface Ag 04/14/2019 Non-reactive    • Hep A IgM 04/14/2019 Non-reactive    • Hepatitis C Ab 04/14/2019 Non-reactive    • Hep B C IgM 04/14/2019 Non-reactive    • N gonorrhoeae, DNA Probe 04/14/2019 Negative    • Chlamydia trachomatis, D* 04/14/2019 Negative        Substance Abuse History:  Social History     Substance and Sexual Activity   Drug Use No       Family Psychiatric History:   Family History   Problem Relation Age of Onset   • Aneurysm Father         ABDOMINAL AORTA   • Hypertension Father    • Hypertension Brother    • Hypertension Paternal Grandmother    • Hypertension Paternal Grandfather    • Other Paternal Grandfather         SUDDEN DEATH    • Aneurysm Paternal Uncle         ABDOMINAL AORTA       The following portions of the patient's history were reviewed and updated as appropriate: allergies, current medications, past family history, past medical history, past social history, past surgical history and problem list     Social History     Socioeconomic History   • Marital status: /Civil Union     Spouse name: Not on file   • Number of children: 3   • Years of education: Not on file   • Highest education level: Not on file   Occupational History   • Not on file   Tobacco Use   • Smoking status: Every Day     Packs/day: 1 00     Years: 30 00     Total pack years: 30 00     Types: Cigarettes   • Smokeless tobacco: Never   • Tobacco comments:     0 5-1 PPD  STARTED AT 14 YRS OLD    Substance and Sexual Activity   • Alcohol use: Yes     Comment: OCCASIONALLY -- COUPLE DRINKS PER WEEK    • Drug use: No   • Sexual activity: Not on file   Other Topics Concern   • Not on file   Social History Narrative    [address]     Social Determinants of Health     Financial Resource Strain: Not on file   Food Insecurity: Not on file Transportation Needs: Not on file   Physical Activity: Not on file   Stress: Not on file   Social Connections: Not on file   Intimate Partner Violence: Not on file   Housing Stability: Not on file     Social History     Social History Narrative    [address]       Objective:       Mental status:  Appearance calm and cooperative , adequate hygiene and grooming and good eye contact    Mood dysphoric   Affect affect was constricted   Speech a normal rate and fluent   Thought Processes coherent/organized and normal thought processes   Hallucinations no hallucinations present    Thought Content no delusions   Abnormal Thoughts no suicidal thoughts  and no homicidal thoughts    Orientation  oriented to person and place and time   Remote Memory short term memory intact and long term memory intact   Attention Span concentration intact   Intellect Appears to be of Average Intelligence   Insight Limited insight   Judgement judgment was limited   Muscle Strength Muscle strength and tone were normal and Normal gait    Language no difficulty naming common objects and no difficulty repeating a phrase    Fund of Knowledge displays adequate knowledge of current events, adequate fund of knowledge regarding past history and adequate fund of knowledge regarding vocabulary                Assessment/Plan:       Diagnoses and all orders for this visit:    Current severe episode of major depressive disorder without psychotic features, unspecified whether recurrent ([facility] [address] )    Screening for colon cancer    SUNDEEP (generalized anxiety disorder)            Treatment Recommendations- Risks Benefits      Immediate Medical/Psychiatric/Psychotherapy Treatments and Any Precautions: continue current treatment     Risks, Benefits And Possible Side Effects Of Medications:  {PSYCH RISK, BENEFITS AND POSSIBLE SIDE EFFECTS (Optional):64638    Controlled Medication Discussion: Discussed with patient Black Box warning on concurrent use of benzodiazepines and opioid medications including sedation, respiratory depression, coma and death  Patient understands the risk of treatment with benzodiazepines in addition to opioids and wants to continue taking those medications  , Discussed with patient the risks of sedation, respiratory depression, impairment of ability to drive and potential for abuse and addiction related to treatment with benzodiazepine medications  The patient understands risk of treatment with benzodiazepine medications, agrees to not drive if feels impaired and agrees to take medications as prescribed  and The patient has been filling controlled prescriptions on time as prescribed to [facility]       Psychotherapy Provided: Individual psychotherapy provided     Individual psychotherapy provided: Yes  Counseling was provided during the session today for 16 minutes  Medications, treatment progress and treatment plan reviewed with Anthony  Medication education provided to Anthony  Goals discussed during in session: continue improvement in depression  Recent stressor including  job stress, health issues, limited support, social difficulties and ongoing anxiety discussed with Gail Lobato  Coping strategies including compliance with medications, contacting a therapist, deep/slow breathing, eliminating avoidance, engaging in previously avoided activities, exercising, getting into a good routine, improving self-esteem, increasing energy, increasing interest in usual activities, increasing motivation, increasing social contact, maintain healthy diet, maintain heathy sleeping hygiene and maintain positive attitude reviewed with Gail Lobato  Importance of medication and treatment compliance reviewed with Anthony  Educated on importance of medication and treatment compliance  Importance of follow up with family physician for medical issues reviewed with Anthony    Supportive therapy provided

## 2023-06-26 ENCOUNTER — ANESTHESIA EVENT (OUTPATIENT)
Dept: GASTROENTEROLOGY | Facility: AMBULARY SURGERY CENTER | Age: 53
End: 2023-06-26

## 2023-06-26 ENCOUNTER — ANESTHESIA (OUTPATIENT)
Dept: GASTROENTEROLOGY | Facility: AMBULARY SURGERY CENTER | Age: 53
End: 2023-06-26

## 2023-06-26 ENCOUNTER — HOSPITAL ENCOUNTER (OUTPATIENT)
Dept: GASTROENTEROLOGY | Facility: AMBULARY SURGERY CENTER | Age: 53
Setting detail: OUTPATIENT SURGERY
Discharge: HOME | End: 2023-06-26
Attending: INTERNAL MEDICINE | Admitting: INTERNAL MEDICINE
Payer: COMMERCIAL

## 2023-06-26 VITALS
HEART RATE: 68 BPM | WEIGHT: 174 LBS | HEIGHT: 70 IN | DIASTOLIC BLOOD PRESSURE: 69 MMHG | BODY MASS INDEX: 24.91 KG/M2 | SYSTOLIC BLOOD PRESSURE: 109 MMHG | OXYGEN SATURATION: 95 % | TEMPERATURE: 97.6 F | RESPIRATION RATE: 24 BRPM

## 2023-06-26 DIAGNOSIS — Z12.11 SCREENING FOR COLON CANCER: ICD-10-CM

## 2023-06-26 RX ORDER — SODIUM CHLORIDE, SODIUM LACTATE, POTASSIUM CHLORIDE, CALCIUM CHLORIDE 600; 310; 30; 20 MG/100ML; MG/100ML; MG/100ML; MG/100ML
INJECTION, SOLUTION INTRAVENOUS CONTINUOUS PRN
Status: DISCONTINUED | OUTPATIENT
Start: 2023-06-26 | End: 2023-06-26

## 2023-06-26 RX ORDER — PROPOFOL 10 MG/ML
INJECTION, EMULSION INTRAVENOUS AS NEEDED
Status: DISCONTINUED | OUTPATIENT
Start: 2023-06-26 | End: 2023-06-26

## 2023-06-26 RX ORDER — LIDOCAINE HYDROCHLORIDE 10 MG/ML
INJECTION, SOLUTION EPIDURAL; INFILTRATION; INTRACAUDAL; PERINEURAL AS NEEDED
Status: DISCONTINUED | OUTPATIENT
Start: 2023-06-26 | End: 2023-06-26

## 2023-06-26 RX ADMIN — PROPOFOL 40 MG: 10 INJECTION, EMULSION INTRAVENOUS at 10:41

## 2023-06-26 RX ADMIN — PROPOFOL 50 MG: 10 INJECTION, EMULSION INTRAVENOUS at 10:49

## 2023-06-26 RX ADMIN — PROPOFOL 120 MG: 10 INJECTION, EMULSION INTRAVENOUS at 10:39

## 2023-06-26 RX ADMIN — SODIUM CHLORIDE, SODIUM LACTATE, POTASSIUM CHLORIDE, AND CALCIUM CHLORIDE: .6; .31; .03; .02 INJECTION, SOLUTION INTRAVENOUS at 10:35

## 2023-06-26 RX ADMIN — PROPOFOL 50 MG: 10 INJECTION, EMULSION INTRAVENOUS at 10:46

## 2023-06-26 RX ADMIN — PROPOFOL 40 MG: 10 INJECTION, EMULSION INTRAVENOUS at 10:43

## 2023-06-26 RX ADMIN — PROPOFOL 40 MG: 10 INJECTION, EMULSION INTRAVENOUS at 10:51

## 2023-06-26 RX ADMIN — LIDOCAINE HYDROCHLORIDE 50 MG: 10 INJECTION, SOLUTION EPIDURAL; INFILTRATION; INTRACAUDAL; PERINEURAL at 10:39

## 2023-06-26 NOTE — ANESTHESIA PREPROCEDURE EVALUATION
Procedure:  COLONOSCOPY    Relevant Problems   CARDIO   (+) Benign essential hypertension   (+) KESSLER (dyspnea on exertion)   (+) Essential hypertension   (+) Hyperlipidemia      MUSCULOSKELETAL   (+) Gout      NEURO/PSYCH   (+) Acute non intractable tension-type headache   (+) SUNDEEP (generalized anxiety disorder)   (+) MDD (major depressive disorder)   (+) MDD (major depressive disorder), recurrent severe, without psychosis ([facility] [address] )      PULMONARY   (+) KESSLER (dyspnea on exertion)      Endocrine   (+) IFG (impaired fasting glucose)      Musculoskeletal and Integument   (+) Sebaceous cysts      Other   (+) Family history of abdominal aortic aneurysm (AAA)   (+) Hypokalemia   (+) Screening PSA (prostate specific antigen)   (+) Tobacco abuse      Lab Results   Component Value Date    SODIUM 135 (L) 04/14/2019    K 4 2 04/14/2019     04/14/2019    CO2 26 04/14/2019    AGAP 3 (L) 04/14/2019    BUN 17 04/14/2019    CREATININE 0 93 04/14/2019    GLUC 100 04/14/2019    CALCIUM 8 8 04/14/2019    AST 13 04/14/2019    ALT 23 04/14/2019    ALKPHOS 97 04/14/2019    TP 7 4 04/14/2019    TBILI 0 40 04/14/2019    EGFR 95 04/14/2019     Lab Results   Component Value Date    WBC 7 05 02/18/2016    HGB 16 9 02/18/2016    HCT 48 0 02/18/2016    MCV 90 02/18/2016     02/18/2016       Physical Exam    Airway    Mallampati score: II  TM Distance: >3 FB  Neck ROM: full     Dental       Cardiovascular      Pulmonary      Other Findings        Anesthesia Plan  ASA Score- 2     Anesthesia Type- IV sedation with anesthesia with ASA Monitors  Additional Monitors:   Airway Plan:           Plan Factors-Exercise tolerance (METS): >4 METS  Chart reviewed  EKG reviewed  Imaging results reviewed  Existing labs reviewed  Patient summary reviewed  Induction-     Postoperative Plan-     Informed Consent- Anesthetic plan and risks discussed with patient  I personally reviewed this patient with the CRNA   Discussed and agreed on the Anesthesia Plan with the CRNA  Cora Motta

## 2023-06-26 NOTE — H&P
"History and Physical -  [facility]  Conrad Richardson 52 y o  male MRN: [id number]    HPI: Conrad Richardson is a 52y o  year old male who presents screening colonoscopy      Review of Systems    Historical Information   Past Medical History:   Diagnosis Date   • Elevated blood pressure reading     LAST ASSESSED [id number]   • Gout     LAST ASSESSED [id number]   • Hypertension    • Social anxiety disorder     LAST ASSESSED [id number]     History reviewed  No pertinent surgical history    Social History   Social History     Substance and Sexual Activity   Alcohol Use Yes    Comment: OCCASIONALLY -- COUPLE DRINKS PER WEEK      Social History     Substance and Sexual Activity   Drug Use No     Social History     Tobacco Use   Smoking Status Every Day   • Packs/day: 1 00   • Years: 30 00   • Total pack years: 30 00   • Types: Cigarettes   Smokeless Tobacco Never   Tobacco Comments    0 5-1 PPD  STARTED AT 14 YRS OLD      Family History   Problem Relation Age of Onset   • Aneurysm Father         ABDOMINAL AORTA   • Hypertension Father    • Hypertension Brother    • Hypertension Paternal Grandmother    • Hypertension Paternal Grandfather    • Other Paternal Grandfather         SUDDEN DEATH    • Aneurysm Paternal Uncle         ABDOMINAL AORTA       Meds/Allergies     (Not in a hospital admission)      Allergies   Allergen Reactions   • Chlorthalidone      Annotation - [id number]: GOUT       Objective     BP 90/52   Pulse 87   Temp 97 6 °F (36 4 °C) (Temporal)   Resp 16   Ht 5' 10\" (1 778 m)   Wt 78 9 kg (174 lb)   SpO2 94%   BMI 24 97 kg/m²       PHYSICAL EXAM    Gen: NAD  CV: RRR  CHEST: Clear  ABD: soft, NT/ND  EXT: no edema  Neuro: AAO      ASSESSMENT/PLAN:  This is a 52y o  year old male here for screening colonoscopy    PLAN: Colonoscopy with biopsy and possible polypectomy  Procedure: Colonoscopy with biopsy and possible polypectomy    "

## 2023-06-26 NOTE — ANESTHESIA POSTPROCEDURE EVALUATION
Post-Op Assessment Note    CV Status:  Stable    Pain management: adequate     Mental Status:  Alert and awake   Hydration Status:  Euvolemic   PONV Controlled:  Controlled   Airway Patency:  Patent      Post Op Vitals Reviewed: Yes      Staff: CRNA         No notable events documented      /64 (06/26/23 1056)    Temp 97 6 °F (36 4 °C) (06/26/23 1056)    Pulse 80 (06/26/23 1056)   Resp 19 (06/26/23 1056)    SpO2 92 % (06/26/23 1056)

## 2023-07-20 DIAGNOSIS — F41.1 GAD (GENERALIZED ANXIETY DISORDER): ICD-10-CM

## 2023-07-20 RX ORDER — ALPRAZOLAM 0.5 MG/1
0.5 TABLET ORAL 3 TIMES DAILY PRN
Qty: 90 TABLET | Refills: 2 | Status: SHIPPED | OUTPATIENT
Start: 2023-07-20 | End: 2023-08-19

## 2023-07-20 NOTE — TELEPHONE ENCOUNTER
Medication Refill Request     Name of Medication Alprazolam  Dose/Frequency 0.5mg three times a day   Quantity 90 tablet  Verified pharmacy   [x]  Verified ordering Provider   [x]  Does patient have enough for the next 3 days? Yes [] No [x]  Does patient have a follow-up appointment scheduled?  Yes [x] No []   If so when is appointment: 12/5/23

## 2023-08-26 DIAGNOSIS — F32.9 MDD (MAJOR DEPRESSIVE DISORDER): ICD-10-CM

## 2023-08-26 RX ORDER — VENLAFAXINE HYDROCHLORIDE 75 MG/1
CAPSULE, EXTENDED RELEASE ORAL
Qty: 90 CAPSULE | Refills: 0 | Status: SHIPPED | OUTPATIENT
Start: 2023-08-26

## 2023-10-22 DIAGNOSIS — F32.9 MDD (MAJOR DEPRESSIVE DISORDER): ICD-10-CM

## 2023-10-23 RX ORDER — VENLAFAXINE HYDROCHLORIDE 75 MG/1
CAPSULE, EXTENDED RELEASE ORAL
Qty: 90 CAPSULE | Refills: 0 | Status: SHIPPED | OUTPATIENT
Start: 2023-10-23

## 2023-12-05 ENCOUNTER — OFFICE VISIT (OUTPATIENT)
Dept: PSYCHIATRY | Facility: CLINIC | Age: 53
End: 2023-12-05
Payer: COMMERCIAL

## 2023-12-05 DIAGNOSIS — F33.2 MDD (MAJOR DEPRESSIVE DISORDER), RECURRENT SEVERE, WITHOUT PSYCHOSIS (HCC): Primary | ICD-10-CM

## 2023-12-05 DIAGNOSIS — F41.1 GAD (GENERALIZED ANXIETY DISORDER): ICD-10-CM

## 2023-12-05 PROCEDURE — 90833 PSYTX W PT W E/M 30 MIN: CPT | Performed by: PSYCHIATRY & NEUROLOGY

## 2023-12-05 PROCEDURE — 99213 OFFICE O/P EST LOW 20 MIN: CPT | Performed by: PSYCHIATRY & NEUROLOGY

## 2023-12-05 RX ORDER — ALPRAZOLAM 0.5 MG/1
0.5 TABLET ORAL 3 TIMES DAILY PRN
Qty: 90 TABLET | Refills: 2 | Status: SHIPPED | OUTPATIENT
Start: 2023-12-05 | End: 2024-03-04

## 2023-12-05 NOTE — PSYCH
Visit Time    Visit Start Time: 2:30  Visit Stop Time: 3:00  Total Visit Duration:  30 minutes    Subjective: Medication Management      Patient ID: Leila Tavares is a 52 y.o. male with MDD and SUNDEEP    HPI ROS Appetite Changes and Sleep: normal appetite, normal energy level, no weight change and normal number of sleep hours   Clara Gutierrez remains compliant with his medications and denies side effects. He denies recent health changes or new medications. He stated his mood has remained stable. 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 He mentioned that he has PCP appointment for annual wellness visit. He stated he will be doing all his fasting blood work. He has enough Venlafaxine refills but needs Alprazolam refill. He is looking forward to see his grandchildren during winter break. He also spent time with his twin sons recently. He wishes to continue current treatment as prescribed and he agrees to schedule follow up every 6 months or sooner if needed. Mood Anxiety and Depression   Behavior Normal    Thought Content Disturbing Thoughts, Feelings   General Emotional Problems and Decreased Functioning   Personality Normal   Other Psych Symptoms Normal   Constitutional Negative   ENT Negative   Cardiovascular Negative   Respiratory Negative   Gastrointestinal Negative   Genitourinary Negative   Musculoskeletal Negative   Integumentary Negative   Neurological Negative   Endocrine Normal    Other Symptoms Normal        Laboratory Results:   Recent Labs (last 12 months):   No visits with results within 12 Month(s) from this visit.    Latest known visit with results is:   Appointment on 04/14/2019   Component Date Value    Sodium 04/14/2019 135 (L)     Potassium 04/14/2019 4.2     Chloride 04/14/2019 106     CO2 04/14/2019 26     ANION GAP 04/14/2019 3 (L)     BUN 04/14/2019 17     Creatinine 04/14/2019 0.93     Glucose 04/14/2019 100     Calcium 04/14/2019 8.8     AST 04/14/2019 13     ALT 04/14/2019 23     Alkaline Phosphatase 04/14/2019 97     Total Protein 04/14/2019 7.4     Albumin 04/14/2019 3.9     Total Bilirubin 04/14/2019 0.40     eGFR 04/14/2019 95     HIV-1/HIV-2 Ab 04/14/2019 Non-Reactive     RPR 04/14/2019 Non-Reactive     Hepatitis B Surface Ag 04/14/2019 Non-reactive     Hep A IgM 04/14/2019 Non-reactive     Hepatitis C Ab 04/14/2019 Non-reactive     Hep B C IgM 04/14/2019 Non-reactive     N gonorrhoeae, DNA Probe 04/14/2019 Negative     Chlamydia trachomatis, D* 04/14/2019 Negative        Substance Abuse History:  Social History     Substance and Sexual Activity   Drug Use No       Family Psychiatric History:   Family History   Problem Relation Age of Onset    Aneurysm Father         ABDOMINAL AORTA    Hypertension Father     Hypertension Brother     Hypertension Paternal Grandmother     Hypertension Paternal Grandfather     Other Paternal Grandfather         SUDDEN DEATH     Aneurysm Paternal Uncle         ABDOMINAL AORTA       The following portions of the patient's history were reviewed and updated as appropriate: allergies, current medications, past family history, past medical history, past social history, past surgical history and problem list.    Social History     Socioeconomic History    Marital status: /Civil Union     Spouse name: Not on file    Number of children: 3    Years of education: Not on file    Highest education level: Not on file   Occupational History    Not on file   Tobacco Use    Smoking status: Every Day     Packs/day: 1.00     Years: 30.00     Total pack years: 30.00     Types: Cigarettes    Smokeless tobacco: Never    Tobacco comments:     0.5-1 PPD  STARTED AT 15 YRS OLD    Vaping Use    Vaping Use: Never used   Substance and Sexual Activity    Alcohol use: Yes     Comment: OCCASIONALLY -- COUPLE DRINKS PER WEEK     Drug use: No    Sexual activity: Not on file   Other Topics Concern    Not on file   Social History Narrative    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[address] -- [employer]     Social [employer] [facility] [employer] [facility]     Financial Resource Strain: Not on file   Food Insecurity: Not on file   Transportation Needs: Not on file   Physical Activity: Not on file   Stress: Not on file   Social Connections: Not on file   Intimate Partner Violence: Not on file   Housing Stability: Not on file     Social History     Social History Narrative    [address]       Objective:       Mental status:  Appearance calm and cooperative , adequate hygiene and grooming and good eye contact    Mood dysphoric   Affect affect was constricted   Speech a normal rate and fluent   Thought Processes coherent/organized and normal thought processes   Hallucinations no hallucinations present    Thought Content no delusions   Abnormal Thoughts no suicidal thoughts  and no homicidal thoughts    Orientation  oriented to person and place and time   Remote Memory short term memory intact and long term memory intact   Attention Span concentration intact   Intellect Appears to be of Average Intelligence   Insight Limited insight   Judgement judgment was limited   Muscle Strength Muscle strength and tone were normal and Normal gait    Language no difficulty naming common objects and no difficulty repeating a phrase    Fund of Knowledge displays adequate knowledge of current events, adequate fund of knowledge regarding past history and adequate fund of knowledge regarding vocabulary                Assessment/Plan:       Diagnoses and all orders for this visit:    MDD (major depressive disorder), recurrent severe, without psychosis ([address])    SUNDEEP (generalized anxiety disorder)  -     ALPRAZolam (XANAX) 0.5 mg tablet;  Take 1 tablet (0.5 mg total) by mouth 3 (three) times a day as needed for anxiety              Treatment Recommendations- Risks Benefits      Immediate Medical/Psychiatric/Psychotherapy Treatments and Any Precautions: continue current treatment     Risks, Benefits And Possible Side Effects Of Medications:  {PSYCH RISK, BENEFITS AND POSSIBLE SIDE EFFECTS (Optional):30628    Controlled Medication Discussion: Discussed with patient Black Box warning on concurrent use of benzodiazepines and opioid medications including sedation, respiratory depression, coma and death. Patient understands the risk of treatment with benzodiazepines in addition to opioids and wants to continue taking those medications. , Discussed with patient the risks of sedation, respiratory depression, impairment of ability to drive and potential for abuse and addiction related to treatment with benzodiazepine medications. The patient understands risk of treatment with benzodiazepine medications, agrees to not drive if feels impaired and agrees to take medications as prescribed. and The patient has been filling controlled prescriptions on time as prescribed to [address] [facility] [address] program.      Psychotherapy Provided: Individual psychotherapy provided. .  Individual psychotherapy provided: Yes  Counseling was provided during the session today for 16 minutes. Medications, treatment progress and treatment plan reviewed with Yves Stein. Medication education provided to Yves Stein. Goals discussed during in session: continue improvement in depression. Recent stressor including  job stress, health issues, limited support, social difficulties and ongoing anxiety discussed with Yves Stein. Coping strategies including compliance with medications, contacting a therapist, deep/slow breathing, eliminating avoidance, engaging in previously avoided activities, exercising, getting into a good routine, improving self-esteem, increasing energy, increasing interest in usual activities, increasing motivation, increasing social contact, maintain healthy diet, maintain heathy sleeping hygiene and maintain positive attitude reviewed with Yves Stein.    Importance of medication and treatment compliance reviewed with Lam Coats. Educated on importance of medication and treatment compliance. Importance of follow up with family physician for medical issues reviewed with Lam Coats. Supportive therapy provided.

## 2023-12-05 NOTE — BH TREATMENT PLAN
TREATMENT PLAN (Medication Management Only)        [address] [facility]    Name and Date of Birth:  Murphy Treadwell 52 y.o. 1970  Date of Treatment Plan: December 5, 2023  Diagnosis/Diagnoses:    1. MDD (major depressive disorder), recurrent severe, without psychosis ([address])    2. SUNDEEP (generalized anxiety disorder)      Strengths/Personal Resources for Self-Care: supportive family, taking medications as prescribed, ability to communicate needs. Area/Areas of need (in own words): anxiety, anxiety symptoms, depression, depressive symptoms  1. Long Term Goal: continue improvement in depression. Target Date:6 months - 6/5/2024  Person/Persons responsible for completion of goal: [facility]  2. Short Term Objective (s) - How will we reach this goal?:   A. Provider new recommended medication/dosage changes and/or continue medication(s): continue current medications as prescribed. B. N/A.  C. N/A. Target Date:6 months - 6/5/2024  Person/Persons Responsible for Completion of Goal: [facility]  Progress Towards Goals: continuing treatment  Treatment Modality: medication management every 6 months  Review due 180 days from date of this plan: 6 months - 6/5/2024  Expected length of service: ongoing treatment  My Physician/PA/NP and I have developed this plan together and I agree to work on the goals and objectives. I understand the treatment goals that were developed for my treatment.

## 2023-12-12 ENCOUNTER — OFFICE VISIT (OUTPATIENT)
Dept: INTERNAL MEDICINE CLINIC | Facility: CLINIC | Age: 53
End: 2023-12-12
Payer: COMMERCIAL

## 2023-12-12 VITALS
BODY MASS INDEX: 26.17 KG/M2 | WEIGHT: 182.8 LBS | HEART RATE: 94 BPM | SYSTOLIC BLOOD PRESSURE: 142 MMHG | OXYGEN SATURATION: 96 % | HEIGHT: 70 IN | DIASTOLIC BLOOD PRESSURE: 84 MMHG

## 2023-12-12 DIAGNOSIS — Z13.0 SCREENING, ANEMIA, DEFICIENCY, IRON: ICD-10-CM

## 2023-12-12 DIAGNOSIS — E78.2 MIXED HYPERLIPIDEMIA: ICD-10-CM

## 2023-12-12 DIAGNOSIS — Z23 ENCOUNTER FOR IMMUNIZATION: ICD-10-CM

## 2023-12-12 DIAGNOSIS — I10 ESSENTIAL HYPERTENSION: ICD-10-CM

## 2023-12-12 DIAGNOSIS — Z13.1 SCREENING FOR DIABETES MELLITUS: ICD-10-CM

## 2023-12-12 DIAGNOSIS — Z82.49 FAMILY HISTORY OF ABDOMINAL AORTIC ANEURYSM (AAA): ICD-10-CM

## 2023-12-12 DIAGNOSIS — H91.93 BILATERAL HEARING LOSS, UNSPECIFIED HEARING LOSS TYPE: Primary | ICD-10-CM

## 2023-12-12 DIAGNOSIS — Z12.5 SCREENING PSA (PROSTATE SPECIFIC ANTIGEN): ICD-10-CM

## 2023-12-12 DIAGNOSIS — Z00.00 WELLNESS EXAMINATION: ICD-10-CM

## 2023-12-12 PROCEDURE — 90471 IMMUNIZATION ADMIN: CPT

## 2023-12-12 PROCEDURE — 90750 HZV VACC RECOMBINANT IM: CPT

## 2023-12-12 PROCEDURE — 99396 PREV VISIT EST AGE 40-64: CPT | Performed by: INTERNAL MEDICINE

## 2023-12-12 NOTE — PROGRESS NOTES
[facility] ADULT ANNUAL PHYSICAL  [facility]    NAME: Anthony Orellana  AGE: 53 y.o. SEX: male  : 1970     DATE: 2023     Assessment and Plan:     Problem List Items Addressed This Visit        Cardiovascular and Mediastinum    Essential hypertension     Hypertension -mildly elevated today 142/84 reduce sodium reduce reduce stress routine exercise continue to monitor continue with Cozaar 50 mg once daily            Other    Hyperlipidemia     Low-cholesterol diet check lipid profile         Relevant Orders    Lipid Panel with Direct LDL reflex    Screening PSA (prostate specific antigen)    Relevant Orders    PSA, Total Screen    Family history of abdominal aortic aneurysm (AAA)     Will check ultrasound abdominal aorta strong family history of aneurysm         Relevant Orders    US abdominal aorta screening aaa    Wellness examination     Assessment and plan 1.  Health maintenance annual wellness examination overall the patient is clinically stable and doing well, we encouraged the patient to follow a healthy and balanced diet.  We recommend that the patient exercise routinely approximately 30 minutes 5 times per week .  We have reviewed the patient's vaccines and have made recommendations for updates if necessary shingles vaccine consider COVID booster for the future    .  We will be ordering screening laboratories which are age appropriate.  Return to the office in 1 year    call if any problems.        Other Visit Diagnoses     Bilateral hearing loss, unspecified hearing loss type    -  Primary    Relevant Orders    Ambulatory Referral to Audiology    Screening for diabetes mellitus        Relevant Orders    Comprehensive metabolic panel    Hemoglobin A1C    Screening, anemia, deficiency, iron        Relevant Orders    CBC (Includes Diff/Plt) (Refl)    Encounter for immunization        Relevant Orders    Zoster Vaccine Recombinant IM (Completed)             Immunizations and preventive care screenings were discussed with patient today. Appropriate education was printed on patient's after visit summary.        Counseling:  Exercise: the importance of regular exercise/physical activity was discussed. Recommend exercise 3-5 times per week for at least 30 minutes.          Return in about 1 year (around 12/12/2024).     Chief Complaint:     Chief Complaint   Patient presents with   • Physical Exam      History of Present Illness:     Adult Annual Physical   Patient here for a comprehensive physical exam. The patient reports no problems.    Diet and Physical Activity  Diet/Nutrition: well balanced diet.   Exercise: walking.      Depression Screening  PHQ-2/9 Depression Screening    Little interest or pleasure in doing things: 0 - not at all  Feeling down, depressed, or hopeless: 0 - not at all       General Health  Sleep: sleeps poorly.   Hearing: decreased - bilateral.  Vision: goes for regular eye exams.   Dental: regular dental visits.        Health  Symptoms include: none    Advanced Care Planning  Do you have an advanced directive? no  Do you have a durable medical power of ? no     Review of Systems:     Review of Systems   Constitutional:  Negative for activity change, appetite change and unexpected weight change.   HENT:  Negative for congestion and postnasal drip.    Eyes:  Negative for visual disturbance.   Respiratory:  Negative for cough and shortness of breath.    Cardiovascular:  Negative for chest pain.   Gastrointestinal:  Negative for abdominal pain, diarrhea, nausea and vomiting.   Neurological:  Negative for dizziness, light-headedness and headaches.   Hematological:  Negative for adenopathy.      Past Medical History:     Past Medical History:   Diagnosis Date   • Elevated blood pressure reading     LAST ASSESSED 12NOV2013   • Gout     LAST ASSESSED 25NOV2016   • Hypertension    • Social anxiety disorder     LAST ASSESSED 30OCT2017       Past Surgical History:     History reviewed. No pertinent surgical history.   Family History:     Family History   Problem Relation Age of Onset   • Aneurysm Father         ABDOMINAL AORTA   • Hypertension Father    • Hypertension Brother    • Hypertension Paternal Grandmother    • Hypertension Paternal Grandfather    • Other Paternal Grandfather         SUDDEN DEATH    • Aneurysm Paternal Uncle         ABDOMINAL AORTA      Social History:     Social History     Socioeconomic History   • Marital status: /Civil Union     Spouse name: None   • Number of children: 3   • Years of education: None   • Highest education level: None   Occupational History   • None   Tobacco Use   • Smoking status: Every Day     Current packs/day: 1.00     Average packs/day: 1 pack/day for 30.0 years (30.0 ttl pk-yrs)     Types: Cigarettes   • Smokeless tobacco: Never   • Tobacco comments:     0.5-1 PPD  STARTED AT 15 YRS OLD    Vaping Use   • Vaping status: Never Used   Substance and Sexual Activity   • Alcohol use: Yes     Comment: OCCASIONALLY -- COUPLE DRINKS PER WEEK    • Drug use: No   • Sexual activity: None   Other Topics Concern   • None   Social History Narrative    3 CHILDREN  - [place] 1994 -- WHIT[place] 2001 -- ROBYN 2001     Social Determinants of Health     Financial Resource Strain: Not on file   Food Insecurity: Not on file   Transportation Needs: Not on file   Physical Activity: Not on file   Stress: Not on file   Social Connections: Not on file   Intimate Partner Violence: Not on file   Housing Stability: Not on file      Current Medications:     Current Outpatient Medications   Medication Sig Dispense Refill   • ALPRAZolam (XANAX) 0.5 mg tablet Take 1 tablet (0.5 mg total) by mouth 3 (three) times a day as needed for anxiety 90 tablet 2   • losartan (COZAAR) 50 mg tablet TAKE 1 TABLET BY MOUTH EVERY DAY 90 tablet 2   • venlafaxine (EFFEXOR-XR) 75 mg 24 hr capsule TAKE 1 CAPSULE BY MOUTH EVERY DAY 90 capsule 0  "    No current facility-administered medications for this visit.      Allergies:     Allergies   Allergen Reactions   • Chlorthalidone      Annotation - [id number]: GOUT      Physical Exam:     /84   Pulse 94   Ht 5' 10\" (1.778 m)   Wt 82.9 kg (182 lb 12.8 oz)   SpO2 96%   BMI 26.23 kg/m²     Physical Exam  Vitals and nursing note reviewed.   Constitutional:       General: He is not in acute distress.     Appearance: Normal appearance. He is well-developed. He is not ill-appearing, toxic-appearing or diaphoretic.   HENT:      Head: Normocephalic and atraumatic.      Right Ear: External ear normal.      Left Ear: External ear normal.      Nose: Nose normal.   Eyes:      Pupils: Pupils are equal, round, and reactive to light.   Cardiovascular:      Rate and Rhythm: Normal rate and regular rhythm.      Heart sounds: Normal heart sounds. No murmur heard.  Pulmonary:      Effort: Pulmonary effort is normal.      Breath sounds: Normal breath sounds.   Abdominal:      General: There is no distension.      Palpations: Abdomen is soft.      Tenderness: There is no abdominal tenderness. There is no guarding.   Neurological:      Mental Status: He is alert.          Don Colon DO  [facility]    "

## 2023-12-17 NOTE — ASSESSMENT & PLAN NOTE
Assessment and plan 1.  Health maintenance annual wellness examination overall the patient is clinically stable and doing well, we encouraged the patient to follow a healthy and balanced diet.  We recommend that the patient exercise routinely approximately 30 minutes 5 times per week .  We have reviewed the patient's vaccines and have made recommendations for updates if necessary shingles vaccine consider COVID booster for the future    .  We will be ordering screening laboratories which are age appropriate.  Return to the office in 1 year    call if any problems.

## 2023-12-17 NOTE — ASSESSMENT & PLAN NOTE
Hypertension -mildly elevated today 142/84 reduce sodium reduce reduce stress routine exercise continue to monitor continue with Cozaar 50 mg once daily

## 2024-02-21 PROBLEM — Z12.5 SCREENING PSA (PROSTATE SPECIFIC ANTIGEN): Status: RESOLVED | Noted: 2019-03-11 | Resolved: 2024-02-21

## 2024-02-23 DIAGNOSIS — F32.9 MDD (MAJOR DEPRESSIVE DISORDER): ICD-10-CM

## 2024-02-23 RX ORDER — VENLAFAXINE HYDROCHLORIDE 75 MG/1
CAPSULE, EXTENDED RELEASE ORAL
Qty: 90 CAPSULE | Refills: 0 | Status: SHIPPED | OUTPATIENT
Start: 2024-02-23

## 2024-02-29 DIAGNOSIS — I10 ESSENTIAL HYPERTENSION: ICD-10-CM

## 2024-02-29 RX ORDER — LOSARTAN POTASSIUM 50 MG/1
TABLET ORAL
Qty: 90 TABLET | Refills: 2 | Status: SHIPPED | OUTPATIENT
Start: 2024-02-29

## 2024-04-11 DIAGNOSIS — F41.1 GAD (GENERALIZED ANXIETY DISORDER): ICD-10-CM

## 2024-04-11 RX ORDER — ALPRAZOLAM 0.5 MG/1
0.5 TABLET ORAL 3 TIMES DAILY PRN
Qty: 90 TABLET | Refills: 2 | Status: SHIPPED | OUTPATIENT
Start: 2024-04-11 | End: 2024-07-10

## 2024-04-11 NOTE — TELEPHONE ENCOUNTER
Medication Refill Request     Name of Medication xanax  Dose/Frequency 0.5mg take 1 tabket by mouth 3 times a day as needed  Quantity 90  Verified pharmacy   [x]  Verified ordering Provider   [x]  Does patient have enough for the next 3 days? Yes [x] No []  Does patient have a follow-up appointment scheduled? Yes [x] No []   If so when is appointment: 6/6/2024 3pm

## 2024-04-22 ENCOUNTER — TELEPHONE (OUTPATIENT)
Dept: PSYCHIATRY | Facility: CLINIC | Age: 54
End: 2024-04-22

## 2024-04-22 NOTE — TELEPHONE ENCOUNTER
Called and left message for patient to inform 6/6 was cancelled due to provider being out of the office. Requested return call to reschedule. Please schedule upon return call. Thank you.

## 2024-05-26 DIAGNOSIS — F32.9 MDD (MAJOR DEPRESSIVE DISORDER): ICD-10-CM

## 2024-05-26 RX ORDER — VENLAFAXINE HYDROCHLORIDE 75 MG/1
CAPSULE, EXTENDED RELEASE ORAL
Qty: 90 CAPSULE | Refills: 0 | Status: SHIPPED | OUTPATIENT
Start: 2024-05-26

## 2024-07-11 DIAGNOSIS — F32.9 MDD (MAJOR DEPRESSIVE DISORDER): ICD-10-CM

## 2024-07-11 RX ORDER — VENLAFAXINE HYDROCHLORIDE 75 MG/1
CAPSULE, EXTENDED RELEASE ORAL
Qty: 90 CAPSULE | Refills: 0 | Status: SHIPPED | OUTPATIENT
Start: 2024-07-11

## 2024-08-26 ENCOUNTER — TELEPHONE (OUTPATIENT)
Age: 54
End: 2024-08-26

## 2024-08-26 DIAGNOSIS — F41.1 GAD (GENERALIZED ANXIETY DISORDER): ICD-10-CM

## 2024-08-26 RX ORDER — ALPRAZOLAM 0.5 MG
0.5 TABLET ORAL 3 TIMES DAILY PRN
Qty: 90 TABLET | Refills: 2 | Status: CANCELLED | OUTPATIENT
Start: 2024-08-26 | End: 2024-11-24

## 2024-08-26 NOTE — TELEPHONE ENCOUNTER
Medication Refill Request     Name of Medication Xanax  Dose/Frequency .5mg   Quantity 90  Verified pharmacy   [x]  Verified ordering Provider   [x]  Does patient have enough for the next 3 days? Yes [x] No []  Does patient have a follow-up appointment scheduled? Yes [x] No []   If so when is appointment: 9/12/24 at 11am

## 2024-08-26 NOTE — TELEPHONE ENCOUNTER
Patient contacted the office to schedule a follow up visit with provider. Patient is now scheduled for 9/12/24  at 11am in office.

## 2024-08-29 DIAGNOSIS — F41.1 GAD (GENERALIZED ANXIETY DISORDER): ICD-10-CM

## 2024-08-29 NOTE — TELEPHONE ENCOUNTER
Patient called to request a refill for their  xanax advised a refill was requested on 8/26/24 and is pending approval. Patient verbalized understanding and is in agreement.      Pt is out of meds, can you please refill asap, pt has already schedule appt. Please and thank you

## 2024-08-30 DIAGNOSIS — F41.1 GAD (GENERALIZED ANXIETY DISORDER): ICD-10-CM

## 2024-08-30 RX ORDER — ALPRAZOLAM 0.5 MG
0.5 TABLET ORAL 3 TIMES DAILY PRN
Qty: 90 TABLET | Refills: 2 | Status: SHIPPED | OUTPATIENT
Start: 2024-08-30 | End: 2024-11-28

## 2024-08-30 RX ORDER — ALPRAZOLAM 0.5 MG
0.5 TABLET ORAL 3 TIMES DAILY PRN
Qty: 90 TABLET | Refills: 2 | OUTPATIENT
Start: 2024-08-30

## 2024-08-30 NOTE — TELEPHONE ENCOUNTER
Medication:  PDMP  07/11/2024 04/11/2024 ALPRAZolam (Tablet) 90.0 30 0.5 MG NA ROMARIO BARRETT [facility] Commercial [place] 2 / 2 [place]   1 [id number] 05/27/2024 04/11/2024 ALPRAZolam (Tablet) 90.0 30 0.5 MG NA ROMARIO BARRETT [facility] Commercial [place] 1 / 2 [place]   1 [id number] 04/11/2024 04/11/2024 ALPRAZolam (Tablet) 90.0 30 0.5 MG NA [facility]ARIO BARRETT [facility]  Active agreement on file -

## 2024-09-12 ENCOUNTER — OFFICE VISIT (OUTPATIENT)
Dept: PSYCHIATRY | Facility: CLINIC | Age: 54
End: 2024-09-12
Payer: COMMERCIAL

## 2024-09-12 DIAGNOSIS — F33.2 MDD (MAJOR DEPRESSIVE DISORDER), RECURRENT SEVERE, WITHOUT PSYCHOSIS (HCC): Primary | ICD-10-CM

## 2024-09-12 DIAGNOSIS — F41.1 GAD (GENERALIZED ANXIETY DISORDER): ICD-10-CM

## 2024-09-12 PROBLEM — F32.9 MDD (MAJOR DEPRESSIVE DISORDER): Status: RESOLVED | Noted: 2021-08-17 | Resolved: 2024-09-12

## 2024-09-12 PROCEDURE — 99214 OFFICE O/P EST MOD 30 MIN: CPT | Performed by: PSYCHIATRY & NEUROLOGY

## 2024-09-12 PROCEDURE — 90833 PSYTX W PT W E/M 30 MIN: CPT | Performed by: PSYCHIATRY & NEUROLOGY

## 2024-09-12 RX ORDER — VENLAFAXINE HYDROCHLORIDE 75 MG/1
75 CAPSULE, EXTENDED RELEASE ORAL DAILY
Qty: 90 CAPSULE | Refills: 0 | Status: SHIPPED | OUTPATIENT
Start: 2024-10-11 | End: 2024-09-12 | Stop reason: SDUPTHER

## 2024-09-12 RX ORDER — VENLAFAXINE HYDROCHLORIDE 75 MG/1
75 CAPSULE, EXTENDED RELEASE ORAL DAILY
Qty: 90 CAPSULE | Refills: 0 | Status: SHIPPED | OUTPATIENT
Start: 2024-10-11

## 2024-09-12 NOTE — ASSESSMENT & PLAN NOTE
Continue Venlafaxine  Orders:    venlafaxine (EFFEXOR-XR) 75 mg 24 hr capsule; Take 1 capsule (75 mg total) by mouth daily Do not start before October 11, 2024.

## 2024-09-12 NOTE — PSYCH
Visit Time    Visit Start Time: 11:00  Visit Stop Time: 11:30  Total Visit Duration:  30 minutes    Subjective: Medication Management      Patient ID: Anthony Orellana is a 54 y.o. male with MDD and SUNDEEP    HPI ROS Appetite Changes and Sleep: normal appetite, normal energy level, no weight change and normal number of sleep hours   Anthony remains compliant with his medications and denies side effects. He denies recent health changes or new medications. He stated his mood has remained stable.    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   Fasting labs are pending.  He stated he has been taking average 1 mg daily will like to gradually switch dose from am to bedtime.  Bought land near daughter in [place] and hoping to build home there.  He wishes to continue current treatment as prescribed and he agrees to schedule follow up every 6 months or sooner if needed.    Mood Anxiety and Depression   Behavior Normal    Thought Content Disturbing Thoughts, Feelings   General Emotional Problems and Decreased Functioning   Personality Normal   Other Psych Symptoms Normal   Constitutional Negative   ENT Negative   Cardiovascular Negative   Respiratory Negative   Gastrointestinal Negative   Genitourinary Negative   Musculoskeletal Negative   Integumentary Negative   Neurological Negative   Endocrine Normal    Other Symptoms Normal        Laboratory Results:   Recent Labs (last 12 months):   No visits with results within 12 Month(s) from this visit.   Latest known visit with results is:   Appointment on 04/14/2019   Component Date Value    Sodium 04/14/2019 135 (L)     Potassium 04/14/2019 4.2     Chloride 04/14/2019 106     CO2 04/14/2019 26     ANION GAP 04/14/2019 3 (L)     BUN 04/14/2019 17     Creatinine 04/14/2019 0.93     Glucose 04/14/2019 100     Calcium 04/14/2019 8.8     AST 04/14/2019 13     ALT 04/14/2019 23     Alkaline Phosphatase 04/14/2019 97     Total Protein 04/14/2019 7.4      Albumin 04/14/2019 3.9     Total Bilirubin 04/14/2019 0.40     eGFR 04/14/2019 95     HIV-1/HIV-2 Ab 04/14/2019 Non-Reactive     RPR 04/14/2019 Non-Reactive     Hepatitis B Surface Ag 04/14/2019 Non-reactive     Hep A IgM 04/14/2019 Non-reactive     Hepatitis C Ab 04/14/2019 Non-reactive     Hep B C IgM 04/14/2019 Non-reactive     N gonorrhoeae, DNA Probe 04/14/2019 Negative     Chlamydia trachomatis, D* 04/14/2019 Negative        Substance Abuse History:  Social History     Substance and Sexual Activity   Drug Use No       Family Psychiatric History:   Family History   Problem Relation Age of Onset    Aneurysm Father         ABDOMINAL AORTA    Hypertension Father     Hypertension Brother     Hypertension Paternal Grandmother     Hypertension Paternal Grandfather     Other Paternal Grandfather         SUDDEN DEATH     Aneurysm Paternal Uncle         ABDOMINAL AORTA       The following portions of the patient's history were reviewed and updated as appropriate: allergies, current medications, past family history, past medical history, past social history, past surgical history and problem list.    Social History     Socioeconomic History    Marital status: /Civil Union     Spouse name: Not on file    Number of children: 3    Years of education: Not on file    Highest education level: Not on file   Occupational History    Not on file   Tobacco Use    Smoking status: Every Day     Current packs/day: 1.00     Average packs/day: 1 pack/day for 30.0 years (30.0 ttl pk-yrs)     Types: Cigarettes    Smokeless tobacco: Never    Tobacco comments:     0.5-1 PPD  STARTED AT 15 YRS OLD    Vaping Use    Vaping status: Never Used   Substance and Sexual Activity    Alcohol use: Yes     Comment: OCCASIONALLY -- COUPLE DRINKS PER WEEK     Drug use: No    Sexual activity: Not on file   Other Topics Concern    Not on file   Social History Narrative    3 CHILDREN  - [place] 1994 -- [place] 2001 -- ROBYN 2001     Social  Determinants of Health     Financial Resource Strain: Not on file   Food Insecurity: Not on file   Transportation Needs: Not on file   Physical Activity: Not on file   Stress: Not on file   Social Connections: Not on file   Intimate Partner Violence: Not on file   Housing Stability: Not on file     Social History     Social History Narrative    3 CHILDREN  - [place]UAN 1994 -- WHIT[place] 2001 -- ROBYN 2001       Objective:       Mental status:  Appearance calm and cooperative , adequate hygiene and grooming and good eye contact    Mood dysphoric   Affect affect was constricted   Speech a normal rate and fluent   Thought Processes coherent/organized and normal thought processes   Hallucinations no hallucinations present    Thought Content no delusions   Abnormal Thoughts no suicidal thoughts  and no homicidal thoughts    Orientation  oriented to person and place and time   Remote Memory short term memory intact and long term memory intact   Attention Span concentration intact   Intellect Appears to be of Average Intelligence   Insight Limited insight   Judgement judgment was limited   Muscle Strength Muscle strength and tone were normal and Normal gait    Language no difficulty naming common objects and no difficulty repeating a phrase    Fund of Knowledge displays adequate knowledge of current events, adequate fund of knowledge regarding past history and adequate fund of knowledge regarding vocabulary                Assessment/Plan:       Diagnoses and all orders for this visit:    MDD (major depressive disorder), recurrent severe, without psychosis (HCC)    SUNDEEP (generalized anxiety disorder)    MDD (major depressive disorder)  -     venlafaxine (EFFEXOR-XR) 75 mg 24 hr capsule; Take 1 capsule (75 mg total) by mouth daily Do not start before October 11, 2024.      Assessment & Plan  MDD (major depressive disorder), recurrent severe, without psychosis (HCC)  Continue Venlafaxine  Orders:    venlafaxine (EFFEXOR-XR) 75 mg  24 hr capsule; Take 1 capsule (75 mg total) by mouth daily Do not start before October 11, 2024.    SUNDEEP (generalized anxiety disorder)  Continue venlafaxine and Alprazolam        MDD (major depressive disorder)                    Treatment Recommendations- Risks Benefits      Immediate Medical/Psychiatric/Psychotherapy Treatments and Any Precautions: continue current treatment     Risks, Benefits And Possible Side Effects Of Medications:  {PSYCH RISK, BENEFITS AND POSSIBLE SIDE EFFECTS (Optional):03686    Controlled Medication Discussion: Discussed with patient Black Box warning on concurrent use of benzodiazepines and opioid medications including sedation, respiratory depression, coma and death. Patient understands the risk of treatment with benzodiazepines in addition to opioids and wants to continue taking those medications. , Discussed with patient the risks of sedation, respiratory depression, impairment of ability to drive and potential for abuse and addiction related to treatment with benzodiazepine medications. The patient understands risk of treatment with benzodiazepine medications, agrees to not drive if feels impaired and agrees to take medications as prescribed. and The patient has been filling controlled prescriptions on time as prescribed to [place] Prescription Drug Monitoring program.      Psychotherapy Provided: Individual psychotherapy provided.   .  Individual psychotherapy provided: Yes  Counseling was provided during the session today for 16 minutes.  Medications, treatment progress and treatment plan reviewed with Anthony.  Medication education provided to Anthony.  Goals discussed during in session: continue improvement in depression.   Recent stressor including  job stress, health issues, limited support, social difficulties and ongoing anxiety discussed with Anthony.   Coping strategies including compliance with medications, contacting a therapist, deep/slow breathing, eliminating  avoidance, engaging in previously avoided activities, exercising, getting into a good routine, improving self-esteem, increasing energy, increasing interest in usual activities, increasing motivation, increasing social contact, maintain healthy diet, maintain heathy sleeping hygiene and maintain positive attitude reviewed with Anthony.   Importance of medication and treatment compliance reviewed with Anthony.  Educated on importance of medication and treatment compliance.  Importance of follow up with family physician for medical issues reviewed with Anthony.  Supportive therapy provided.

## 2024-09-12 NOTE — BH TREATMENT PLAN
TREATMENT PLAN (Medication Management Only)        [facility]    Name and Date of Birth:  Anthony Orellana 54 y.o. 1970  Date of Treatment Plan: September 12, 2024  Diagnosis/Diagnoses:    1. MDD (major depressive disorder), recurrent severe, without psychosis (HCC)    2. SUNDEEP (generalized anxiety disorder)    3. MDD (major depressive disorder)      Strengths/Personal Resources for Self-Care: taking medications as prescribed, ability to communicate needs.  Area/Areas of need (in own words): anxiety, anxiety symptoms, depression, depressive symptoms  1. Long Term Goal: continue improvement in depression.  Target Date:6 months - 3/12/2025  Person/Persons responsible for completion of goal: Anthony  2.  Short Term Objective (s) - How will we reach this goal?:   A. Provider new recommended medication/dosage changes and/or continue medication(s): continue current medications as prescribed.  B. N/A.  C. N/A.  Target Date:6 months - 3/12/2025  Person/Persons Responsible for Completion of Goal: Anthony  Progress Towards Goals: continuing treatment  Treatment Modality: medication management every 6 months  Review due 180 days from date of this plan: 6 months - 3/12/2025  Expected length of service: ongoing treatment  My Physician/PA/NP and I have developed this plan together and I agree to work on the goals and objectives. I understand the treatment goals that were developed for my treatment.

## 2024-10-07 DIAGNOSIS — I10 ESSENTIAL HYPERTENSION: ICD-10-CM

## 2024-10-08 RX ORDER — LOSARTAN POTASSIUM 50 MG/1
TABLET ORAL
Qty: 90 TABLET | Refills: 2 | Status: SHIPPED | OUTPATIENT
Start: 2024-10-08

## 2025-01-06 DIAGNOSIS — F41.1 GAD (GENERALIZED ANXIETY DISORDER): ICD-10-CM

## 2025-01-06 NOTE — TELEPHONE ENCOUNTER
Reason for call:   [x] Refill   [] Prior Auth  [] Other:     Office:   [] PCP/Provider -   [x] Specialty/Provider -   Ordering Department: [facility] [place]  Authorized By: Larissa Rodriguez MD    Medication: ALPRAZolam (XANAX) 0.5 mg tablet     Dose/Frequency:  Take 1 tablet (0.5 mg total) by mouth 3 (three) times a day as needed for anxiety,     Quantity: 90    Pharmacy: [facility]/pharmacy #[id number] - [place], [place] - [address] [phone]    Does the patient have enough for 3 days?   [x] Yes   [] No - Send as HP to POD

## 2025-01-07 RX ORDER — ALPRAZOLAM 0.5 MG
0.5 TABLET ORAL 3 TIMES DAILY PRN
Qty: 90 TABLET | Refills: 0 | Status: SHIPPED | OUTPATIENT
Start: 2025-01-07 | End: 2025-04-07

## 2025-01-07 NOTE — TELEPHONE ENCOUNTER
PDMP 1 [id number] 11/18/2024 08/30/2024 ALPRAZolam (Tablet) 90.0 30 0.5 MG NA [facility] BARRETT [facility] Commercial Insurance 2 / 2 [place]   1 [id number] 10/07/2024 08/30/2024 ALPRAZolam (Tablet) 90.0 30 0.5 MG NA ROMARIO BARRETT [facility] [place] 1 / 2 [place]

## 2025-02-19 ENCOUNTER — OFFICE VISIT (OUTPATIENT)
Dept: INTERNAL MEDICINE CLINIC | Facility: CLINIC | Age: 55
End: 2025-02-19
Payer: COMMERCIAL

## 2025-02-19 VITALS
DIASTOLIC BLOOD PRESSURE: 80 MMHG | OXYGEN SATURATION: 92 % | WEIGHT: 166 LBS | SYSTOLIC BLOOD PRESSURE: 110 MMHG | HEART RATE: 101 BPM | BODY MASS INDEX: 23.77 KG/M2 | TEMPERATURE: 97.2 F | HEIGHT: 70 IN

## 2025-02-19 DIAGNOSIS — Z13.6 SCREENING FOR CARDIOVASCULAR CONDITION: ICD-10-CM

## 2025-02-19 DIAGNOSIS — Z12.5 SCREENING PSA (PROSTATE SPECIFIC ANTIGEN): ICD-10-CM

## 2025-02-19 DIAGNOSIS — Z13.1 SCREENING FOR DIABETES MELLITUS: ICD-10-CM

## 2025-02-19 DIAGNOSIS — J01.30 ACUTE NON-RECURRENT SPHENOIDAL SINUSITIS: Primary | ICD-10-CM

## 2025-02-19 DIAGNOSIS — F17.210 SMOKING GREATER THAN 20 PACK YEARS: ICD-10-CM

## 2025-02-19 PROBLEM — F33.2 MDD (MAJOR DEPRESSIVE DISORDER), RECURRENT SEVERE, WITHOUT PSYCHOSIS (HCC): Status: RESOLVED | Noted: 2017-05-31 | Resolved: 2025-02-19

## 2025-02-19 PROCEDURE — 99213 OFFICE O/P EST LOW 20 MIN: CPT | Performed by: INTERNAL MEDICINE

## 2025-02-19 RX ORDER — AMOXICILLIN 500 MG/1
500 CAPSULE ORAL EVERY 8 HOURS SCHEDULED
Qty: 30 CAPSULE | Refills: 0 | Status: SHIPPED | OUTPATIENT
Start: 2025-02-19 | End: 2025-03-01

## 2025-02-19 RX ORDER — FLUTICASONE PROPIONATE 50 MCG
2 SPRAY, SUSPENSION (ML) NASAL DAILY
Qty: 1 G | Refills: 0 | Status: SHIPPED | OUTPATIENT
Start: 2025-02-19

## 2025-02-19 NOTE — PROGRESS NOTES
Name: Anthony Orellana      : 1970      MRN: [id number]  Encounter Provider: Don Colon DO  Encounter Date: 2025   Encounter department: [facility]  :  Assessment & Plan  Smoking greater than 20 pack years  I have counseled the patient to quit smoking, I have recommended that the patient set up a quit date and I have asked the patient to cut down the cigarettes until the quit date.  Patient has cut back significantly since being sick did encourage him to try to wean off the tobacco could consider nicotine replacement lung cancer screening CAT scan ordered  Orders:  •  CT lung screening program; Future    Acute non-recurrent sphenoidal sinusitis  The patient did have viral-like syndromes 1 week preceding current symptoms are consistent with sphenoid sinus infection he does have pressure behind the eye and nasal congestion postnasal drip continue Mucinex 600 mg twice daily as needed, use Flonase 2 sprays each nostril once daily for the next 10 days Rx for amoxicillin 500 mg 1 capsule every 8 hours for 10 days plan fluids and rest call if any change, worse or symptoms not sanchez.  Orders:  •  amoxicillin (AMOXIL) 500 mg capsule; Take 1 capsule (500 mg total) by mouth every 8 (eight) hours for 10 days  •  fluticasone (FLONASE) 50 mcg/act nasal spray; 2 sprays into each nostril daily    Screening for cardiovascular condition    Orders:  •  Lipid Panel with Direct LDL reflex; Future    Screening for diabetes mellitus    Orders:  •  Comprehensive metabolic panel; Future  •  Hemoglobin A1C; Future    Screening PSA (prostate specific antigen)    Orders:  •  PSA, Total Screen; Future    Return to the office in the next several months for routine follow-up please call with any change, worse or symptoms do not sanchez       History of Present Illness   HPI 54-year-old male coming in with a chief complaint of sinus pressure and pain behind the eye nasal congestion postnasal drip preceding  "this symptom development he did have viral-like symptoms consistent with 1 wk ago scratchy throat , stomach nause , diarrhea , decrease appetite , now eating still low , past week end chest congestion , sinus congetion , insomnia ,  no f/ c  week end was last temp 100 body ache no exposure ,  tried mucinex Immodium   Review of Systems   Constitutional:  Negative for activity change, appetite change and unexpected weight change.   HENT:  Positive for congestion, postnasal drip, rhinorrhea, sinus pressure and sinus pain.    Eyes:  Negative for visual disturbance.   Respiratory:  Negative for cough and shortness of breath.    Cardiovascular:  Negative for chest pain.   Neurological:  Negative for dizziness, light-headedness and headaches.   Hematological:  Negative for adenopathy.       Objective   /80 (BP Location: Left arm, Patient Position: Sitting, Cuff Size: Standard)   Pulse 101   Temp (!) 97.2 °F (36.2 °C) (Tympanic)   Ht 5' 10\" (1.778 m)   Wt 75.3 kg (166 lb)   SpO2 92%   BMI 23.82 kg/m²   Nasal congestion/postnasal drip patient does describe pressure behind the eye  Physical Exam  Vitals and nursing note reviewed.   Constitutional:       General: He is not in acute distress.     Appearance: Normal appearance. He is well-developed. He is not ill-appearing, toxic-appearing or diaphoretic.   HENT:      Head: Normocephalic and atraumatic.      Right Ear: External ear normal.      Left Ear: External ear normal.      Nose: Nose normal.      Mouth/Throat:      Pharynx: No oropharyngeal exudate.   Eyes:      General: No scleral icterus.        Right eye: No discharge.         Left eye: No discharge.      Conjunctiva/sclera: Conjunctivae normal.      Pupils: Pupils are equal, round, and reactive to light.   Cardiovascular:      Heart sounds: Normal heart sounds. No murmur heard.  Pulmonary:      Effort: No respiratory distress.      Breath sounds: No wheezing or rales.   Lymphadenopathy:      Cervical: No " cervical adenopathy.   Neurological:      Mental Status: He is alert.

## 2025-02-20 DIAGNOSIS — F41.1 GAD (GENERALIZED ANXIETY DISORDER): ICD-10-CM

## 2025-02-20 RX ORDER — ALPRAZOLAM 0.5 MG
0.5 TABLET ORAL 3 TIMES DAILY PRN
Qty: 90 TABLET | Refills: 0 | Status: SHIPPED | OUTPATIENT
Start: 2025-02-20 | End: 2025-05-21

## 2025-02-20 NOTE — TELEPHONE ENCOUNTER
Refill cannot be delegated    1 [id number] 01/07/2025 01/07/2025 ALPRAZolam (Tablet) 90.0 30 0.5 MG [facility] [place] / [place]   1 [id number] 11/18/2024 08/30/2024 ALPRAZolam (Tablet) 90.0 30 0.5 MG [facility] [place] 2 / 2 [place]   1 [id number] 10/07/2024 08/30/2024 ALPRAZolam (Tablet) 90.0 30 0.5 MG [facility] [place] 1 / 2 [place]   1 [id number] 08/30/2024 08/30/2024 ALPRAZolam (Tablet) 90.0 30 0.5 MG [facility] [place] / 2 [place]   1 [id number] 07/11/2024 04/11/2024 ALPRAZolam (Tablet) 90.0 30 0.5 MG [facility] [place] 2 / 2 [place]   1 [id number] 05/27/2024 04/11/2024 ALPRAZolam (Tablet) 90.0 30 0.5 MG [facility] [place] 1 / 2 [place]   1 [id number] 04/11/2024 04/11/2024 ALPRAZolam (Tablet) 90.0 30 0.5 MG [facility] [place] / [place]   1 [id number] 02/29/2024 12/05/2023 ALPRAZolam (Tablet) 90.0 30 0.5 MG [facility] [place] 2 / 2 [place]

## 2025-02-20 NOTE — TELEPHONE ENCOUNTER
Reason for call:   [x] Refill   [] Prior Auth  [] Other:     Office:   [] PCP/Provider -   [x] Specialty/Provider - Larissa Rodriguez MD / [facility]     Medication: ALPRAZolam (XANAX) 0.5 mg tablet / Take 1 tablet (0.5 mg total) by mouth 3 (three) times a day as needed for anxiety     Pharmacy: [facility]/pharmacy #[id number] - [place], [place] - [address]     Does the patient have enough for 3 days?   [x] Yes   [] No - Send as HP to POD

## 2025-03-13 ENCOUNTER — OFFICE VISIT (OUTPATIENT)
Dept: PSYCHIATRY | Facility: CLINIC | Age: 55
End: 2025-03-13
Payer: COMMERCIAL

## 2025-03-13 ENCOUNTER — TELEPHONE (OUTPATIENT)
Dept: PSYCHIATRY | Facility: CLINIC | Age: 55
End: 2025-03-13

## 2025-03-13 DIAGNOSIS — J01.30 ACUTE NON-RECURRENT SPHENOIDAL SINUSITIS: ICD-10-CM

## 2025-03-13 DIAGNOSIS — F33.2 MDD (MAJOR DEPRESSIVE DISORDER), RECURRENT SEVERE, WITHOUT PSYCHOSIS (HCC): ICD-10-CM

## 2025-03-13 DIAGNOSIS — F41.1 GAD (GENERALIZED ANXIETY DISORDER): Primary | ICD-10-CM

## 2025-03-13 PROCEDURE — 99214 OFFICE O/P EST MOD 30 MIN: CPT | Performed by: PSYCHIATRY & NEUROLOGY

## 2025-03-13 RX ORDER — VENLAFAXINE HYDROCHLORIDE 75 MG/1
75 CAPSULE, EXTENDED RELEASE ORAL DAILY
Qty: 90 CAPSULE | Refills: 1 | Status: SHIPPED | OUTPATIENT
Start: 2025-03-13

## 2025-03-13 NOTE — TELEPHONE ENCOUNTER
Called and left message for patient to return a call to [phone] and schedule 6 month follow up with provider (Larissa Jeronimo). Please schedule upon return call. Thank you.

## 2025-03-13 NOTE — PSYCH
MEDICATION MANAGEMENT NOTE    Name: Anthony Orellana      : 1970      MRN: [id number]  Encounter Provider: Larissa Rodriguez MD  Encounter Date: 3/13/2025   Encounter department: [facility]    Insurance: Payor: [employer] CROSS / Plan: [facility] PLAN 361 / Product Type: Blue Fee for Service /      Reason for Visit: No chief complaint on file.  :  Assessment & Plan  SUNDEEP (generalized anxiety disorder)         MDD (major depressive disorder), recurrent severe, without psychosis (HCC)    Orders:    venlafaxine (EFFEXOR-XR) 75 mg 24 hr capsule; Take 1 capsule (75 mg total) by mouth daily        Treatment Recommendations:    Educated about diagnosis and treatment modalities. Verbalizes understanding and agreement with the treatment plan.  Discussed self monitoring of symptoms, and symptom monitoring tools.  Discussed medications and if treatment adjustment was needed or desired.  Aware of 24 hour and weekend coverage for urgent situations accessed by calling [facility] main practice number  I am scheduling this patient out for greater than 3 months: Yes - Patient's stability of symptoms warrant this length of time or no significant changes to treatment plan    Medications Risks/Benefits:      Risks, Benefits And Possible Side Effects Of Medications:    Risks, benefits, and possible side effects of medications explained to Anthony and he (or legal representative) verbalizes understanding and agreement for treatment.    Controlled Medication Discussion:     Anthony has been filling controlled prescriptions on time as prescribed according to [place] Prescription Drug Monitoring Program  Discussed with Anthony the risks of sedation, respiratory depression, impairment of ability to drive and potential for abuse and addiction related to treatment with benzodiazepine medications. He understands risk of treatment with benzodiazepine medications, agrees to not  drive if feels impaired and agrees to take medications as prescribed  Discussed with Anthony the risks of impairment of ability to drive and potential for abuse and addiction related to treatment with stimulant medications. He understands risk of treatment with stimulant medications, agrees to not drive if feels impaired and agrees to take medications as prescribed  Anthony is using medication appropriately  Discussed with Anthony Black Box warning on concurrent use of benzodiazepines and opioid medications including sedation, respiratory depression, coma and death. He understands the risk of treatment with benzodiazepines in addition to opioids and wants to continue taking those medications  Discussed with Anthony increased risk of impairment related to concurrent use of benzodiazepines and hypnotic medications including excessive sedation, psychomotor impairment and respiratory depression. He understands the risk of treatment with benzodiazepines in addition to hypnotic medications and wants to continue taking those medications  Anthony has not been filling controlled prescriptions on time as prescribed according to [place] Prescription Drug Monitoring Program  Discussed with Anthony need to slowly taper off benzodiazepines as recommended by [place] Prescription Drug Monitoring Program, due to concurrent use of opioid medications and increased risk of sedation, respiratory depression, coma and death with that combination      History of Present Illness     Anthony remains compliant with his medications and denies side effects. He denies recent health changes or new medications. He stated his mood has remained stable.    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   He stated he is scheduled to do fasting labs soon.  He stated he has been taking average 1 mg daily will like to gradually switch dose from am to bedtime.  Bought land near daughter in [place] and hoping to build  home there.  He stated recently was tx with amoxicillin for sinus infection.   Continues to see Dr Colon for PCP.  He wishes to continue current treatment as prescribed and he agrees to schedule follow up every 6 months or sooner if needed.    Review Of Systems: A review of systems is obtained and is negative except for the pertinent positives listed in HPI/Subjective above.      Current Rating Scores:         Areas of Improvement: reviewed in HPI/Subjective Section and reviewed in Assessment and Plan Section      Past Medical History:   Diagnosis Date    Elevated blood pressure reading     LAST ASSESSED 12NOV2013    Gout     LAST ASSESSED 25NOV2016    Hypertension     Social anxiety disorder     LAST ASSESSED 30OCT2017        History reviewed. No pertinent surgical history.  Allergies:   Allergies   Allergen Reactions    Chlorthalidone      Annotation - 25Nov2016: GOUT       Current Outpatient Medications   Medication Sig Dispense Refill    venlafaxine (EFFEXOR-XR) 75 mg 24 hr capsule Take 1 capsule (75 mg total) by mouth daily 90 capsule 1    ALPRAZolam (XANAX) 0.5 mg tablet Take 1 tablet (0.5 mg total) by mouth 3 (three) times a day as needed for anxiety 90 tablet 0    losartan (COZAAR) 50 mg tablet TAKE 1 TABLET BY MOUTH EVERY DAY 90 tablet 2     No current facility-administered medications for this visit.       Substance Abuse History:    Social History     Substance and Sexual Activity   Alcohol Use Yes    Alcohol/week: 5.0 standard drinks of alcohol    Types: 5 Cans of beer per week    Comment: OCCASIONALLY -- COUPLE DRINKS PER WEEK      Social History     Substance and Sexual Activity   Drug Use No       Social History:    Social History     Socioeconomic History    Marital status: /Civil Union     Spouse name: Not on file    Number of children: 3    Years of education: Not on file    Highest education level: Not on file   Occupational History    Not on file   Tobacco Use    Smoking status: Every Day      Current packs/day: 1.00     Average packs/day: 1 pack/day for 69.2 years (69.2 ttl pk-yrs)     Types: Cigarettes     Start date: 1/1/1986    Smokeless tobacco: Never    Tobacco comments:     0.5-1 PPD  STARTED AT 15 YRS OLD    Vaping Use    Vaping status: Never Used   Substance and Sexual Activity    Alcohol use: Yes     Alcohol/week: 5.0 standard drinks of alcohol     Types: 5 Cans of beer per week     Comment: OCCASIONALLY -- COUPLE DRINKS PER WEEK     Drug use: No    Sexual activity: Not on file   Other Topics Concern    Not on file   Social History Narrative    3 CHILDREN  - JUAN 1994 -- WHITLEY 2001 -- ROBYN 2001     Social Drivers of Health     Financial Resource Strain: Not on file   Food Insecurity: Not on file   Transportation Needs: Not on file   Physical Activity: Not on file   Stress: Not on file   Social Connections: Not on file   Intimate Partner Violence: Not on file   Housing Stability: Not on file       Family Psychiatric History:     Family History   Problem Relation Age of Onset    Aneurysm Father         ABDOMINAL AORTA    Hypertension Father     Hypertension Brother     Hypertension Paternal Grandmother     Hypertension Paternal Grandfather     Aneurysm Paternal Uncle         ABDOMINAL AORTA       Medical History Reviewed by provider this encounter:  Tobacco  Allergies  Meds  Problems  Med Hx  Surg Hx  Fam Hx  Soc   Hx         Objective   There were no vitals taken for this visit.     Mental Status Evaluation:    Appearance age appropriate, casually dressed   Behavior cooperative, calm   Speech normal rate, normal volume, normal pitch, spontaneous   Mood anxious   Affect normal range and intensity, appropriate   Thought Processes organized, goal directed   Thought Content no overt delusions   Perceptual Disturbances: no auditory hallucinations, no visual hallucinations   Abnormal Thoughts  Risk Potential Suicidal ideation - None  Homicidal ideation - None  Potential for  aggression - No   Orientation oriented to person, place, time/date, and situation   Memory recent and remote memory grossly intact   Consciousness alert and awake   Attention Span Concentration Span attention span and concentration are age appropriate   Intellect appears to be of average intelligence   Insight intact   Judgement intact   Muscle Strength and  Gait normal muscle strength and normal muscle tone, normal gait and normal balance   Motor activity no abnormal movements   Language no difficulty naming common objects, no difficulty repeating a phrase, no difficulty writing a sentence   Fund of Knowledge adequate knowledge of current events  adequate fund of knowledge regarding past history  adequate fund of knowledge regarding vocabulary                Laboratory Results: I have personally reviewed all pertinent laboratory/tests results    Recent Labs (last 6 months):   No visits with results within 6 Month(s) from this visit.   Latest known visit with results is:   Appointment on 04/14/2019   Component Date Value    Sodium 04/14/2019 135 (L)     Potassium 04/14/2019 4.2     Chloride 04/14/2019 106     CO2 04/14/2019 26     ANION GAP 04/14/2019 3 (L)     BUN 04/14/2019 17     Creatinine 04/14/2019 0.93     Glucose 04/14/2019 100     Calcium 04/14/2019 8.8     AST 04/14/2019 13     ALT 04/14/2019 23     Alkaline Phosphatase 04/14/2019 97     Total Protein 04/14/2019 7.4     Albumin 04/14/2019 3.9     Total Bilirubin 04/14/2019 0.40     eGFR 04/14/2019 95     HIV-1/HIV-2 Ab 04/14/2019 Non-Reactive     RPR 04/14/2019 Non-Reactive     Hepatitis B Surface Ag 04/14/2019 Non-reactive     Hep A IgM 04/14/2019 Non-reactive     Hepatitis C Ab 04/14/2019 Non-reactive     Hep B C IgM 04/14/2019 Non-reactive     N gonorrhoeae, DNA Probe 04/14/2019 Negative     Chlamydia trachomatis, D* 04/14/2019 Negative        Suicide/Homicide Risk Assessment:    Risk of Harm to Self:  The following ratings are based on assessment at  the time of the interview    Risk of Harm to Others:  The following ratings are based on assessment at the time of the interview    The following interventions are recommended: Continue medication management. No other intervention changes indicated at this time.    Psychotherapy Provided:     Individual psychotherapy provided: No    Treatment Plan:    Completed and signed during the session: Yes - with Anthony    Goals: Progress towards Treatment Plan goals - Yes, progressing, as evidenced by subjective findings in HPI/Subjective Section and in Assessment and Plan Section    Depression Follow-up Plan Completed: Not applicable    Note Share:    This note was shared with patient.    Administrative Statements   Administrative Statements   I have spent a total time of 20 minutes in caring for this patient on the day of the visit/encounter including Prognosis, Risks and benefits of tx options, Instructions for management, Patient and family education, Importance of tx compliance, Risk factor reductions, Impressions, Documenting in the medical record, Reviewing/placing orders in the medical record (including tests, medications, and/or procedures), Obtaining or reviewing history  , and Communicating with other healthcare professionals .    Visit Time  Visit Start Time: 2:00 PM  Visit Stop Time: 2:20 PM  Total Visit Duration:  20 minutes    Larissa Rodriguez MD 03/13/25

## 2025-03-13 NOTE — BH TREATMENT PLAN
TREATMENT PLAN (Medication Management Only)        [facility]    Name and Date of Birth:  Anthony Orellana 55 y.o. 1970  MRN: [id number]  Date of Treatment Plan: March 13, 2025  Diagnosis/Diagnoses:    1. SUNDEEP (generalized anxiety disorder)      Strengths/Personal Resources for Self-Care: taking medications as prescribed, ability to communicate needs.  Area/Areas of need (in own words): anxiety, anxiety symptoms, depression, depressive symptoms  1. Long Term Goal:   continue improvement in depression.  Target Date:6 months - 9/13/2025  Person/Persons responsible for completion of goal: Anthony  2.  Short Term Objective (s) - How will we reach this goal?:   A.  Provider new recommended medication/dosage changes and/or continue medication(s): continue current medications as prescribed.  B.  N/A.  C.  N/A.  Target Date:6 months - 9/13/2025  Person/Persons Responsible for Completion of Goal: Anthony  Progress Towards Goals: continuing treatment  Treatment Modality: medication management every 6 months  Review due 180 days from date of this plan: 6 months - 9/13/2025  Expected length of service: ongoing treatment unless revised  My Physician/PA/NP and I have developed this plan together and I agree to work on the goals and objectives. I understand the treatment goals that were developed for my treatment.   Electronic Signatures: on file (unless signed below)    Larissa Rodriguez MD 03/13/25

## 2025-03-14 RX ORDER — FLUTICASONE PROPIONATE 50 MCG
SPRAY, SUSPENSION (ML) NASAL
Qty: 48 ML | Refills: 1 | OUTPATIENT
Start: 2025-03-14

## 2025-04-07 ENCOUNTER — TELEPHONE (OUTPATIENT)
Age: 55
End: 2025-04-07

## 2025-04-07 DIAGNOSIS — F41.1 GAD (GENERALIZED ANXIETY DISORDER): ICD-10-CM

## 2025-04-07 NOTE — TELEPHONE ENCOUNTER
Medication Refill Request     Name of Medication Xanax  Dose/Frequency 0.5mg  Quantity 90 tab  Verified pharmacy   [x]  Verified ordering Provider   [x]  Does patient have enough for the next 3 days? Yes [x] No []  Does patient have a follow-up appointment scheduled? Yes [x] No []   If so when is appointment: 9/2 at 3pm

## 2025-04-07 NOTE — TELEPHONE ENCOUNTER
02/20/2025 02/20/2025 ALPRAZolam (Tablet) 90.0 30 0.5 MG NA [facility] Commercial [place] 0 / 0 [place]   1 [id number] 01/07/2025 01/07/2025 ALPRAZolam (Tablet) 90.0 30 0.5 [facility] [place] 0 / 0 [place]   1 [id number] 11/18/2024 08/30/2024 ALPRAZolam (Tablet) 90.0 30 0.5 MG [facility] [place] 2 / 2 [place]   1 [id number] 10/07/2024 08/30/2024 ALPRAZolam (Tablet) 90.0 30 0.5 MG [facility] [place] 1 / 2 [place]   1 [id number] 08/30/2024 08/30/2024 ALPRAZolam (Tablet) 90.0 30 0.5 MG [facility] [place] 0 / 2 [place]   1 [id number] 07/11/2024 04/11/2024 ALPRAZolam (Tablet) 90.0 30 0.5 MG [facility] [place] 2 / 2 [place]   1 [id number] 05/27/2024 04/11/2024 ALPRAZolam (Tablet) 90.0 30 0.5 MG N

## 2025-04-08 RX ORDER — ALPRAZOLAM 0.5 MG
0.5 TABLET ORAL 3 TIMES DAILY PRN
Qty: 90 TABLET | Refills: 0 | Status: SHIPPED | OUTPATIENT
Start: 2025-04-08 | End: 2025-07-07

## 2025-05-27 DIAGNOSIS — F41.1 GAD (GENERALIZED ANXIETY DISORDER): ICD-10-CM

## 2025-05-27 RX ORDER — ALPRAZOLAM 0.5 MG
0.5 TABLET ORAL 3 TIMES DAILY PRN
Qty: 90 TABLET | Refills: 0 | Status: SHIPPED | OUTPATIENT
Start: 2025-05-27 | End: 2025-08-25

## 2025-05-27 NOTE — TELEPHONE ENCOUNTER
Reason for call:   [x] Refill   [] Prior Auth  [] Other:     Office:   [] PCP/Provider -   [x] Specialty/Provider -   Ordering Department: [facility]  Authorized By: Larissa Rodriguez MD    Medication:  ALPRAZolam (XANAX) 0.5 mg tablet    Dose/Frequency: Take 1 tablet (0.5 mg total) by mouth 3 (three) times a day as needed for anxiety     Quantity: 90    Pharmacy: [facility]/pharmacy #[id number] - [place], [place] - [address] [phone]    Local Pharmacy   Does the patient have enough for 3 days?   [] Yes   [x] No - Send as HP to POD    Mail Away Pharmacy   Does the patient have enough for 10 days?   [] Yes   [x] No - Send as HP to POD

## 2025-05-27 NOTE — TELEPHONE ENCOUNTER
04/08/2025 04/08/2025 ALPRAZolam (Tablet) 90.0 30 0.5 MG NA [facility] Commercial [place] 0 / 0 [place]   1 [id number] ** 02/20/2025 02/20/2025 ALPRAZolam (Tablet) 90.0 30 0.5 MG NA [facility] Commercial [place] 0 / 0 [place]   1 [id number] ** 01/07/2025 01/07/2025 ALPRAZolam (Tablet) 90.0 30 0.5 MG NA [facility] Commercial Insurance 0 / 0 [place]   1 [id number] ** 11/18/2024 08/30/2024 ALPRAZolam (Tablet) 90.0 30 0.5 [facility] Commercial [place] 2 / 2 [place]   1 [id number] ** 10/07/2024 08/30/2024 ALPRAZolam (Tablet) 90.0 30 0.5 [facility] Commercial [place] 1 / 2 [place]   1 [id number] ** 08/30/2024 08/30/2024 ALPRAZolam (Tablet) 90.

## 2025-06-19 ENCOUNTER — NURSE TRIAGE (OUTPATIENT)
Age: 55
End: 2025-06-19

## 2025-06-19 NOTE — TELEPHONE ENCOUNTER
"REASON FOR CONVERSATION: Gout    SYMPTOMS: Swelling and redness of left great toe since last night     OTHER HEALTH INFORMATION: history of gout.  Increased intake of red meat lately.     PROTOCOL DISPOSITION: See Today in Office    CARE ADVICE PROVIDED: Go to Urgent Care     PRACTICE FOLLOW-UP: no      Reason for Disposition   SEVERE pain (e.g., excruciating, unable to do any normal activities)    Answer Assessment - Initial Assessment Questions  1. ONSET: \"When did the pain start?\"       Last night   2. LOCATION: \"Where is the pain located?\"       Left great toe   3. PAIN: \"How bad is the pain?\"    (Scale 1-10; or mild, moderate, severe)      8/10  4. WORK OR EXERCISE: \"Has there been any recent work or exercise that involved this part of the body?\"       no  5. CAUSE: \"What do you think is causing the foot pain?\"       I think I have gout   6. OTHER SYMPTOMS: \"Do you have any other symptoms?\" (e.g., leg pain, rash, fever, numbness)      no  7. PREGNANCY: \"Is there any chance you are pregnant?\" \"When was your last menstrual period?\"      no    Protocols used: Foot Pain-Adult-[place]    "

## 2025-06-20 ENCOUNTER — OFFICE VISIT (OUTPATIENT)
Dept: INTERNAL MEDICINE CLINIC | Facility: CLINIC | Age: 55
End: 2025-06-20
Payer: COMMERCIAL

## 2025-06-20 VITALS
HEART RATE: 104 BPM | OXYGEN SATURATION: 96 % | SYSTOLIC BLOOD PRESSURE: 140 MMHG | BODY MASS INDEX: 24.68 KG/M2 | DIASTOLIC BLOOD PRESSURE: 100 MMHG | WEIGHT: 172 LBS

## 2025-06-20 DIAGNOSIS — M10.072 ACUTE IDIOPATHIC GOUT INVOLVING TOE OF LEFT FOOT: Primary | ICD-10-CM

## 2025-06-20 PROCEDURE — 99214 OFFICE O/P EST MOD 30 MIN: CPT | Performed by: INTERNAL MEDICINE

## 2025-06-20 RX ORDER — INDOMETHACIN 25 MG/1
25 CAPSULE ORAL
Qty: 30 CAPSULE | Refills: 1 | Status: SHIPPED | OUTPATIENT
Start: 2025-06-20

## 2025-06-20 RX ORDER — PREDNISOLONE ACETATE 10 MG/ML
SUSPENSION/ DROPS OPHTHALMIC
COMMUNITY
Start: 2025-05-28

## 2025-06-20 RX ORDER — COLCHICINE 0.6 MG/1
0.6 TABLET ORAL DAILY PRN
Qty: 30 TABLET | Refills: 1 | Status: SHIPPED | OUTPATIENT
Start: 2025-06-20

## 2025-06-20 NOTE — ASSESSMENT & PLAN NOTE
Discussed with patient treatment options including colchicine daily, and in the first I can take it 3 times a day.  Also indomethacin short-term use.  Discussed with patient medication to lower uric acid levels after this acute episode, since patient has not had frequent episodes, not interested in allopurinol to start after this acute episode, can follow-up with PCP if having ongoing symptoms  Orders:    colchicine (COLCRYS) 0.6 mg tablet; Take 1 tablet (0.6 mg total) by mouth daily as needed for muscle/joint pain    indomethacin (INDOCIN) 25 mg capsule; Take 1 capsule (25 mg total) by mouth 3 (three) times a day with meals Until gout resolves

## 2025-06-20 NOTE — PATIENT INSTRUCTIONS
Problem List Items Addressed This Visit          Musculoskeletal and Integument    Acute idiopathic gout involving toe of left foot - Primary    Discussed with patient treatment options including colchicine daily, and in the first I can take it 3 times a day.  Also indomethacin short-term use.  Discussed with patient medication to lower uric acid levels after this acute episode, since patient has not had frequent episodes, not interested in allopurinol to start after this acute episode, can follow-up with PCP if having ongoing symptoms  Orders:    colchicine (COLCRYS) 0.6 mg tablet; Take 1 tablet (0.6 mg total) by mouth daily as needed for muscle/joint pain    indomethacin (INDOCIN) 25 mg capsule; Take 1 capsule (25 mg total) by mouth 3 (three) times a day with meals Until gout resolves         Relevant Medications    colchicine (COLCRYS) 0.6 mg tablet    indomethacin (INDOCIN) 25 mg capsule

## 2025-06-20 NOTE — PROGRESS NOTES
Name: Anthony Orellana      : 1970      MRN: [id number]  Encounter Provider: Paulo Luna MD  Encounter Date: 2025   Encounter department: [facility]  :  Assessment & Plan  Acute idiopathic gout involving toe of left foot  Discussed with patient treatment options including colchicine daily, and in the first I can take it 3 times a day.  Also indomethacin short-term use.  Discussed with patient medication to lower uric acid levels after this acute episode, since patient has not had frequent episodes, not interested in allopurinol to start after this acute episode, can follow-up with PCP if having ongoing symptoms  Orders:    colchicine (COLCRYS) 0.6 mg tablet; Take 1 tablet (0.6 mg total) by mouth daily as needed for muscle/joint pain    indomethacin (INDOCIN) 25 mg capsule; Take 1 capsule (25 mg total) by mouth 3 (three) times a day with meals Until gout resolves           History of Present Illness   I am seeing pt in coverage for an acute issue.  Pt has gout left big toe, onset 2 days ago.  Pt was eating a lot of red meat lately.  Patient had a uric acid done in 2016 which was elevated, has never been on allopurinol.  No other recent episodes of gout.  No trauma to the toe      Review of Systems   Constitutional:  Negative for chills and fever.   Musculoskeletal:  Positive for arthralgias.       Objective   /100 (BP Location: Left arm, Patient Position: Sitting, Cuff Size: Standard)   Pulse 104   Wt 78 kg (172 lb)   SpO2 96%   BMI 24.68 kg/m²      Physical Exam  Constitutional:       General: He is not in acute distress.     Appearance: Normal appearance.     Musculoskeletal:      Comments: Left base of toe joint red, swollen, tender, warm     Neurological:      Mental Status: He is alert.

## 2025-07-11 DIAGNOSIS — F41.1 GAD (GENERALIZED ANXIETY DISORDER): ICD-10-CM

## 2025-07-11 RX ORDER — ALPRAZOLAM 0.5 MG
0.5 TABLET ORAL 3 TIMES DAILY PRN
Qty: 90 TABLET | Refills: 0 | Status: SHIPPED | OUTPATIENT
Start: 2025-07-11 | End: 2025-10-09

## 2025-07-11 NOTE — TELEPHONE ENCOUNTER
Reason for call:   [x] Refill   [] Prior Auth  [] Other:     Office:   [] PCP/Provider -   [x] Specialty/Provider - [facility]     Medication:     ALPRAZolam (XANAX) 0.5 mg tablet       Dose/Frequency: 0.5 mg, 3 times daily PRN     Quantity: 90    Pharmacy: [id number]    Local Pharmacy   Does the patient have enough for 3 days?   [x] Yes   [] No - Send as HP to POD    Mail Away Pharmacy   Does the patient have enough for 10 days?   [] Yes   [] No - Send as HP to POD

## 2025-07-11 NOTE — TELEPHONE ENCOUNTER
Refill must be reviewed and completed by the office or provider. The refill is unable to be approved or denied by the medication management team.      05/27/2025 05/27/2025 ALPRAZolam (Tablet) 90.0 30 0.5 MG NA [facility] 0 / 0 [place]   1 [id number] ** 04/08/2025 04/08/2025 ALPRAZolam (Tablet) 90.0 30 0.5 [facility] 0 / 0 [place]   1 [id number] ** 02/20/2025 02/20/2025 ALPRAZolam (Tablet) 90.0 30 0.5 MG [facility] 0 / 0 [place]

## 2025-07-12 DIAGNOSIS — M10.072 ACUTE IDIOPATHIC GOUT INVOLVING TOE OF LEFT FOOT: ICD-10-CM

## 2025-07-15 RX ORDER — COLCHICINE 0.6 MG/1
0.6 TABLET ORAL DAILY PRN
Qty: 90 TABLET | Refills: 1 | Status: SHIPPED | OUTPATIENT
Start: 2025-07-15

## 2025-08-20 DIAGNOSIS — F41.1 GAD (GENERALIZED ANXIETY DISORDER): ICD-10-CM

## 2025-08-20 RX ORDER — ALPRAZOLAM 0.5 MG
0.5 TABLET ORAL 3 TIMES DAILY PRN
Qty: 90 TABLET | Refills: 0 | Status: SHIPPED | OUTPATIENT
Start: 2025-08-20 | End: 2025-11-18